# Patient Record
Sex: MALE | Race: WHITE | NOT HISPANIC OR LATINO | Employment: STUDENT | URBAN - METROPOLITAN AREA
[De-identification: names, ages, dates, MRNs, and addresses within clinical notes are randomized per-mention and may not be internally consistent; named-entity substitution may affect disease eponyms.]

---

## 2018-03-29 ENCOUNTER — NURSE TRIAGE (OUTPATIENT)
Dept: NURSING | Facility: CLINIC | Age: 28
End: 2018-03-29

## 2018-03-29 NOTE — TELEPHONE ENCOUNTER
Reason for Disposition    [1] Eye pain AND [2] light increases pain    Additional Information    Negative: [1] Major bleeding (e.g., actively dripping or spurting) AND [2] can't be stopped    Negative: Knocked out (unconscious) > 1 minute    Negative: Difficult to awaken or acting confused  (e.g., disoriented, slurred speech)    Negative: Severe neck pain    Negative: Sounds like a life-threatening emergency to the triager    Negative: Wound looks infected    Negative: Foreign body in the eye    Negative: Head injury is the primary problem    Negative: Neck injury is the primary problem    Negative: Vision is blurred or lost in either eye    Negative: Double vision or unable to look upwards    Negative: Cut on eyelid or eyeball (Exception: superficial scratch on eyelid)    Negative: [1] Bleeding AND [2] won't stop after 10 minutes of direct pressure (using correct technique)    Negative: Skin is split open or gaping  (or length > 1/4 inch or 6 mm)    Negative: Bloody or cloudy fluid behind the cornea (clear part)    Negative: Sharp object hit the eye (e.g., metallic chip or flying glass)    Negative: Object hit the eye at high speed  (e.g., paintball, BB, metal from nail hammering, something thrown from a )    Negative: Two black eyes (bilateral)    Negative: Sounds like a serious injury to the triager    Negative: [1] SEVERE pain AND [2] not improved 2 hours after pain medicine/ice packs    Protocols used: EYE INJURY-ADULT-AH    He's not sure if he can get to the ER. He may be able to get in later today. He wanted to know what to use around home. I said he shouldn't use anything stronger than sterile saline, which would be like tears, as anything like Visine would cause pain if there is a scratch in the eye ball. He has light sensitivity.  Ashlyn Batres RN-[facility]

## 2019-01-28 ENCOUNTER — NURSE TRIAGE (OUTPATIENT)
Dept: NURSING | Facility: CLINIC | Age: 29
End: 2019-01-28

## 2019-01-28 NOTE — TELEPHONE ENCOUNTER
"    Reason for Disposition    Scrotum is painful or tender to touch    Additional Information    Negative: [1] Rash or color change of scrotum BUT [2] no swelling or pain    Negative: Inguinal hernia previously diagnosed by a physician    Negative: Swelling followed a genital injury    Negative: Pain in scrotum is main symptom    Answer Assessment - Initial Assessment Questions  1. SCROTAL SWELLING: \"What does the scrotum look like?\" \"How swollen is it?\" (mild, moderate severe; compare to other side)      Left, swollen  2. LOCATION: \"Where is the swelling located?\"      Left testicle  3. ONSET: \"When did the swelling start?\"      today  4. PATTERN: \"Does it come and go, or has it been constant since it started?\"      constant  5. SCROTAL PAIN: \"Is there any pain?\" If so, ask: \"How bad is it?\"  (Scale 1-10; or mild, moderate, severe)      moderate  6. HERNIA: \"Has a doctor ever told you that you have a hernia?\"      no  7. OTHER SYMPTOMS: \"Do you have any other symptoms?\" (e.g., fever, abdominal pain, vomiting, difficulty passing urine)      denies    Protocols used: SCROTUM SWELLING-ADULT-AH      "

## 2023-03-13 ENCOUNTER — LAB (OUTPATIENT)
Dept: LAB | Facility: CLINIC | Age: 33
End: 2023-03-13
Payer: COMMERCIAL

## 2023-03-13 DIAGNOSIS — Z31.41 ENCOUNTER FOR SPERM COUNT FOR FERTILITY TESTING: ICD-10-CM

## 2023-03-13 PROCEDURE — 89322 SEMEN ANAL STRICT CRITERIA: CPT

## 2023-03-14 LAB
ABNORMAL SPERM MORPHOLOGY: 99
ABSTINENCE DAYS: 3 DAYS (ref 2–7)
AGGLUTINATION: NO
ANALYSIS TEMP - CENTIGRADE: 22 CENTIGRADE
COLLECTION METHOD: ABNORMAL
COLLECTION SITE: ABNORMAL
CONSENT TO RELEASE TO PARTNER: YES
DAL- RECEIVED TIME: ABNORMAL
HEAD DEFECT: 96 %
IMMOTILE: 68 %
LIQUEFIED: YES
MIDPIECE DEFECT: 43 %
NON-PROGRESSIVE MOTILITY: 2 %
NORMAL SPERM MORPHOLOGY: 1 % NORMAL FORMS
PROGRESSIVE MOTILITY: 30 %
ROUND CELLS: 0.4 MILLION/ML
SPECIMEN PH: 7.6 PH
SPECIMEN VOLUME: 1.9 ML
SPERM CONCENTRATION: 122 MILLION/ML
TAIL DEFECT: 8 %
TIME OF ANALYSIS: ABNORMAL
TOTAL PROGRESSIVE MOTILE NUMBER: 70 MILLION
TOTAL SPERM NUMBER: 232 MILLION
VISCOUS: NO
VITALITY: 62 %

## 2023-03-16 DIAGNOSIS — R86.9 ABNORMAL SEMEN ANALYSIS: Primary | ICD-10-CM

## 2023-03-17 ENCOUNTER — TELEPHONE (OUTPATIENT)
Dept: UROLOGY | Facility: CLINIC | Age: 33
End: 2023-03-17
Payer: COMMERCIAL

## 2023-03-17 DIAGNOSIS — N46.9 MALE INFERTILITY: Primary | ICD-10-CM

## 2023-03-17 NOTE — LETTER
Mr.Joseph ANGELO Garcia  [address] [facility] [address]        March 17, 2023    Dear ,    Dr. Romero has placed a few labs for you to complete prior to your appointment with him. They are listed below. Before an appointment the patient should have the following completed.     Semen Analysis (Strict Morph). For the sample the patient needs to abstain from ejaculation for 2-5 days prior, and the sample should be collected in clinic. Ejaculating too frequently can deplete the count whereas abstaining for long periods of time can decrease the number of live sperm. No lubrication, powders, saliva, or intercourse can be used. Partners can be in the room and help produce the sample. Please call and set an appointment up right away. They tend to book up to 1 month ahead.  If this has already been completed it can be faxed to ([phone]).     of  [facility]  [address] [place] [address]  [phone]      Lab work - Testosterone free and total, FSH (follicle stimulating hormone), and Estradiol ultra sensitive. This lab work can be done at any [facility] lab but must be drawn early in the morning before 8 am. Please complete these labs at least 2 weeks prior to your appointment. Some of the labs can take over a week to process.      Any previous OB/GYN office notes, urology office notes, or operative reports (varicocele, and testicular surgery or treatment). This can be faxed to ([phone]).    If you have any questions or concerns please contact us here at the clinic or Mychart us.     Thank you  Your Urology Care Team

## 2023-03-17 NOTE — TELEPHONE ENCOUNTER
[facility] Call Center    Phone Message    May a detailed message be left on voicemail: yes     Reason for Call: Order(s): Other:   Reason for requested: lab orders  Date needed: 06/01/2023  Provider name:     Please place any necessary orders for pts fertility appt on 6/14/23- thank you      Action Taken: Message routed to:  Adult Clinics: Urology p [id number]    Travel Screening: Not Applicable

## 2023-03-17 NOTE — TELEPHONE ENCOUNTER
Called and spoke to patient in regards to labs that need to be completed. Patient recently had a semen analysis on 3/13/23. Patient asked to complete blood draw at any of the [facility] labs. Patient asked if he could get in any sooner than regular scheduled appointment. Staff stated that once he completes the blood draw either Mychart or call the clinic and staff could assist with a sooner appointment.     Rosetta Rascon LPN on 3/17/2023 at 3:32 PM

## 2023-03-25 ENCOUNTER — HEALTH MAINTENANCE LETTER (OUTPATIENT)
Age: 33
End: 2023-03-25

## 2023-04-04 ENCOUNTER — LAB (OUTPATIENT)
Dept: LAB | Facility: CLINIC | Age: 33
End: 2023-04-04
Payer: COMMERCIAL

## 2023-04-04 DIAGNOSIS — N46.9 MALE INFERTILITY, UNSPECIFIED: Primary | ICD-10-CM

## 2023-04-04 DIAGNOSIS — N46.9 MALE INFERTILITY: ICD-10-CM

## 2023-04-04 LAB — FSH SERPL IRP2-ACNC: 3.9 MIU/ML (ref 1.5–12.4)

## 2023-04-04 PROCEDURE — 83001 ASSAY OF GONADOTROPIN (FSH): CPT

## 2023-04-04 PROCEDURE — 82670 ASSAY OF TOTAL ESTRADIOL: CPT

## 2023-04-04 PROCEDURE — 84403 ASSAY OF TOTAL TESTOSTERONE: CPT

## 2023-04-04 PROCEDURE — 36415 COLL VENOUS BLD VENIPUNCTURE: CPT

## 2023-04-04 PROCEDURE — 84270 ASSAY OF SEX HORMONE GLOBUL: CPT

## 2023-04-05 LAB — SHBG SERPL-SCNC: 33 NMOL/L (ref 11–80)

## 2023-04-06 LAB
TESTOST FREE SERPL-MCNC: 9.23 NG/DL
TESTOST SERPL-MCNC: 447 NG/DL (ref 240–950)

## 2023-04-11 LAB — ESTRADIOL SERPL HS-MCNC: 15 PG/ML (ref 10–40)

## 2023-04-13 NOTE — RESULT ENCOUNTER NOTE
Results released to [employer].    Thank you for performing these tests prior to your appointment.      We will discuss these results in depth at your coming appointment.

## 2023-04-18 ASSESSMENT — ENCOUNTER SYMPTOMS
DIARRHEA: 0
HEADACHES: 0
SORE THROAT: 0
CHILLS: 0
DYSURIA: 0
HEARTBURN: 0
HEMATOCHEZIA: 0
MYALGIAS: 1
NERVOUS/ANXIOUS: 1
FREQUENCY: 0
PARESTHESIAS: 0
ABDOMINAL PAIN: 0
PALPITATIONS: 0
NAUSEA: 0
ARTHRALGIAS: 0
FEVER: 0
DIZZINESS: 0
WEAKNESS: 0
EYE PAIN: 0
HEMATURIA: 0
JOINT SWELLING: 0
CONSTIPATION: 0
SHORTNESS OF BREATH: 0
COUGH: 0

## 2023-04-19 ENCOUNTER — OFFICE VISIT (OUTPATIENT)
Dept: FAMILY MEDICINE | Facility: CLINIC | Age: 33
End: 2023-04-19
Payer: COMMERCIAL

## 2023-04-19 VITALS
HEART RATE: 76 BPM | RESPIRATION RATE: 16 BRPM | DIASTOLIC BLOOD PRESSURE: 87 MMHG | SYSTOLIC BLOOD PRESSURE: 126 MMHG | TEMPERATURE: 97.6 F | BODY MASS INDEX: 27.13 KG/M2 | OXYGEN SATURATION: 100 % | HEIGHT: 78 IN | WEIGHT: 234.5 LBS

## 2023-04-19 DIAGNOSIS — Z00.00 ROUTINE GENERAL MEDICAL EXAMINATION AT A HEALTH CARE FACILITY: ICD-10-CM

## 2023-04-19 DIAGNOSIS — Z13.1 SCREENING FOR DIABETES MELLITUS: ICD-10-CM

## 2023-04-19 DIAGNOSIS — J30.2 CHRONIC SEASONAL ALLERGIC RHINITIS: ICD-10-CM

## 2023-04-19 DIAGNOSIS — Z23 HIGH PRIORITY FOR 2019-NCOV VACCINE: ICD-10-CM

## 2023-04-19 DIAGNOSIS — Z11.59 NEED FOR HEPATITIS C SCREENING TEST: ICD-10-CM

## 2023-04-19 DIAGNOSIS — Z11.4 SCREENING FOR HIV (HUMAN IMMUNODEFICIENCY VIRUS): ICD-10-CM

## 2023-04-19 DIAGNOSIS — Z13.220 SCREENING FOR HYPERLIPIDEMIA: Primary | ICD-10-CM

## 2023-04-19 LAB
ANION GAP SERPL CALCULATED.3IONS-SCNC: 4 MMOL/L (ref 3–14)
BUN SERPL-MCNC: 16 MG/DL (ref 7–30)
CALCIUM SERPL-MCNC: 9.5 MG/DL (ref 8.5–10.1)
CHLORIDE BLD-SCNC: 109 MMOL/L (ref 94–109)
CHOLEST SERPL-MCNC: 144 MG/DL
CO2 SERPL-SCNC: 24 MMOL/L (ref 20–32)
CREAT SERPL-MCNC: 0.9 MG/DL (ref 0.66–1.25)
FASTING STATUS PATIENT QL REPORTED: YES
GFR SERPL CREATININE-BSD FRML MDRD: >90 ML/MIN/1.73M2
GLUCOSE BLD-MCNC: 105 MG/DL (ref 70–99)
HBA1C MFR BLD: 5.2 % (ref 0–5.6)
HDLC SERPL-MCNC: 39 MG/DL
LDLC SERPL CALC-MCNC: 73 MG/DL
NONHDLC SERPL-MCNC: 105 MG/DL
POTASSIUM BLD-SCNC: 4.9 MMOL/L (ref 3.4–5.3)
SODIUM SERPL-SCNC: 137 MMOL/L (ref 133–144)
TRIGL SERPL-MCNC: 161 MG/DL

## 2023-04-19 PROCEDURE — 99385 PREV VISIT NEW AGE 18-39: CPT | Mod: 25 | Performed by: INTERNAL MEDICINE

## 2023-04-19 PROCEDURE — 80061 LIPID PANEL: CPT | Performed by: INTERNAL MEDICINE

## 2023-04-19 PROCEDURE — 0124A COVID-19 VACCINE BIVALENT BOOSTER 12+ (PFIZER): CPT | Performed by: INTERNAL MEDICINE

## 2023-04-19 PROCEDURE — 87389 HIV-1 AG W/HIV-1&-2 AB AG IA: CPT | Performed by: INTERNAL MEDICINE

## 2023-04-19 PROCEDURE — 80048 BASIC METABOLIC PNL TOTAL CA: CPT | Performed by: INTERNAL MEDICINE

## 2023-04-19 PROCEDURE — 36415 COLL VENOUS BLD VENIPUNCTURE: CPT | Performed by: INTERNAL MEDICINE

## 2023-04-19 PROCEDURE — 83036 HEMOGLOBIN GLYCOSYLATED A1C: CPT | Performed by: INTERNAL MEDICINE

## 2023-04-19 PROCEDURE — 91312 COVID-19 VACCINE BIVALENT BOOSTER 12+ (PFIZER): CPT | Performed by: INTERNAL MEDICINE

## 2023-04-19 PROCEDURE — 86803 HEPATITIS C AB TEST: CPT | Performed by: INTERNAL MEDICINE

## 2023-04-19 ASSESSMENT — ENCOUNTER SYMPTOMS
ARTHRALGIAS: 0
CONSTIPATION: 0
JOINT SWELLING: 0
NERVOUS/ANXIOUS: 1
EYE PAIN: 0
MYALGIAS: 1
SHORTNESS OF BREATH: 0
ABDOMINAL PAIN: 0
DIZZINESS: 0
PARESTHESIAS: 0
HEMATOCHEZIA: 0
CHILLS: 0
HEMATURIA: 0
FEVER: 0
PALPITATIONS: 0
COUGH: 0
NAUSEA: 0
HEARTBURN: 0
DIARRHEA: 0
SORE THROAT: 0
HEADACHES: 0
DYSURIA: 0
FREQUENCY: 0
WEAKNESS: 0

## 2023-04-19 ASSESSMENT — PAIN SCALES - GENERAL: PAINLEVEL: NO PAIN (0)

## 2023-04-19 NOTE — NURSING NOTE
Prior to immunization administration, verified patients identity using patient s name and date of birth. Please see Immunization Activity for additional information.     Screening Questionnaire for Adult Immunization    Are you sick today?   No   Do you have allergies to medications, food, a vaccine component or latex?   No   Have you ever had a serious reaction after receiving a vaccination?   No   Do you have a long-term health problem with heart, lung, kidney, or metabolic disease (e.g., diabetes), asthma, a blood disorder, no spleen, complement component deficiency, a cochlear implant, or a spinal fluid leak?  Are you on long-term aspirin therapy?   No   Do you have cancer, leukemia, HIV/AIDS, or any other immune system problem?   No   Do you have a parent, brother, or sister with an immune system problem?   No   In the past 3 months, have you taken medications that affect  your immune system, such as prednisone, other steroids, or anticancer drugs; drugs for the treatment of rheumatoid arthritis, Crohn s disease, or psoriasis; or have you had radiation treatments?   No   Have you had a seizure, or a brain or other nervous system problem?   No   During the past year, have you received a transfusion of blood or blood    products, or been given immune (gamma) globulin or antiviral drug?   No   For women: Are you pregnant or is there a chance you could become       pregnant during the next month?   No   Have you received any vaccinations in the past 4 weeks?   No     Immunization questionnaire answers were all negative.      Injection of Covid bivalent PPfizer given by Demi Rivera MA. Patient instructed to remain in clinic for 15 minutes afterwards, and to report any adverse reactions.     Screening performed by Demi Rivera MA on 4/19/2023 at 8:03 AM.

## 2023-04-19 NOTE — PROGRESS NOTES
SUBJECTIVE:   CC: Phill is an 32 year old who presents for preventative health visit.        View : No data to display.              Patient has been advised of split billing requirements and indicates understanding: Yes  Healthy Habits:     Getting at least 3 servings of Calcium per day:  Yes    Bi-annual eye exam:  Yes    Dental care twice a year:  Yes    Sleep apnea or symptoms of sleep apnea:  Daytime drowsiness and Excessive snoring    Diet:  Regular (no restrictions)    Frequency of exercise:  2-3 days/week    Duration of exercise:  Greater than 60 minutes    Taking medications regularly:  Yes    Medication side effects:  Not applicable    PHQ-2 Total Score: 2    Additional concerns today:  Yes              Today's PHQ-2 Score:       2023    10:27 AM   PHQ-2 (  Pfizer)   Q1: Little interest or pleasure in doing things 1   Q2: Feeling down, depressed or hopeless 1   PHQ-2 Score 2   Q1: Little interest or pleasure in doing things Several days    Several days   Q2: Feeling down, depressed or hopeless Several days    Several days   PHQ-2 Score 2    2       Have you ever done Advance Care Planning? (For example, a Health Directive, POLST, or a discussion with a medical provider or your loved ones about your wishes): No, advance care planning information given to patient to review.  Advanced care planning was discussed at today's visit.  Has been talking with wife    Social History     Tobacco Use     Smoking status: Former     Packs/day: 0.50     Types: Cigarettes     Quit date: 2013     Years since quittin.7     Smokeless tobacco: Never   Vaping Use     Vaping status: Never Used   Substance Use Topics     Alcohol use: Yes     Comment: occ             2023    10:27 AM   Alcohol Use   Prescreen: >3 drinks/day or >7 drinks/week? No          View : No data to display.                Last PSA: No results found for: PSA    Reviewed orders with patient. Reviewed health maintenance and updated orders  "accordingly - Yes  Lab work is in process    Reviewed and updated as needed this visit by clinical staff   Tobacco  Allergies  Meds              Reviewed and updated as needed this visit by Provider                     Review of Systems   Constitutional: Negative for chills and fever.   HENT: Positive for congestion. Negative for ear pain, hearing loss and sore throat.    Eyes: Negative for pain and visual disturbance.   Respiratory: Negative for cough and shortness of breath.    Cardiovascular: Negative for chest pain, palpitations and peripheral edema.   Gastrointestinal: Negative for abdominal pain, constipation, diarrhea, heartburn, hematochezia and nausea.   Genitourinary: Negative for dysuria, frequency, genital sores, hematuria, impotence, penile discharge and urgency.   Musculoskeletal: Positive for myalgias. Negative for arthralgias and joint swelling.   Skin: Negative for rash.   Neurological: Negative for dizziness, weakness, headaches and paresthesias.   Psychiatric/Behavioral: Negative for mood changes. The patient is nervous/anxious.          OBJECTIVE:   /87 (BP Location: Right arm, Patient Position: Sitting, Cuff Size: Adult Large)   Pulse 76   Temp 97.6  F (36.4  C) (Oral)   Resp 16   Ht 1.99 m (6' 6.35\")   Wt 106.4 kg (234 lb 8 oz)   SpO2 100%   BMI 26.86 kg/m      Physical Exam  GENERAL: healthy, alert and no distress  EYES: Eyes grossly normal to inspection, PERRL and conjunctivae and sclerae normal  HENT: ear canals and TM's normal, nose and mouth without ulcers or lesions  NECK: no adenopathy, no asymmetry, masses, or scars and thyroid normal to palpation  RESP: lungs clear to auscultation - no rales, rhonchi or wheezes  CV: regular rate and rhythm, normal S1 S2, no S3 or S4, no murmur, click or rub, no peripheral edema and peripheral pulses strong  ABDOMEN: soft, nontender, no hepatosplenomegaly, no masses and bowel sounds normal  MS: no gross musculoskeletal defects noted, no " edema  SKIN: no suspicious lesions or rashes  NEURO: Normal strength and tone, mentation intact and speech normal  PSYCH: mentation appears normal, affect normal/bright    Diagnostic Test Results:  Labs reviewed in Epic    ASSESSMENT/PLAN:   (Z13.220) Screening for hyperlipidemia  (primary encounter diagnosis)  Comment:   Plan: Lipid panel reflex to direct LDL Fasting            (Z11.4) Screening for HIV (human immunodeficiency virus)  Comment:   Plan: HIV Antigen Antibody Combo            (Z11.59) Need for hepatitis C screening test  Comment:   Plan: Hepatitis C Screen Reflex to HCV RNA Quant and         Genotype            (Z00.00) Routine general medical examination at a health care facility  Comment: He is in good physical health  Denies any complaints whatsoever except allergies which happen all the year around  He is up-to-date with all the vaccines except the COVID booster  We discussed about flu vaccine today  Plan: Basic metabolic panel  (Ca, Cl, CO2, Creat,         Gluc, K, Na, BUN)            (Z13.1) Screening for diabetes mellitus  Comment:  Plan: Hemoglobin A1c        He has a family history of diabetes    (Z23) High priority for 2019-nCoV vaccine  Comment:   Plan: COVID-19,PF,PFIZER BOOSTER BIVALENT 12+Yrs            (J30.2) Chronic seasonal allergic rhinitis  Comment:   Plan: Adult Allergy/Asthma Referral        He has chronic allergies all the year around  For the same lot of sneezing and runny nose  In the winter the allergies are bad because of the closed atmosphere  He does have pets at home  In the summer they are bad from the pollen and dust  He want to see an allergy specialist      Patient has been advised of split billing requirements and indicates understanding: No      COUNSELING:   Reviewed preventive health counseling, as reflected in patient instructions       Regular exercise       Healthy diet/nutrition       Vision screening        He reports that he quit smoking about 9 years ago.  His smoking use included cigarettes. He smoked an average of .5 packs per day. He has never used smokeless tobacco.            Bg Osorio MD  [facility]

## 2023-04-20 LAB
HCV AB SERPL QL IA: NONREACTIVE
HIV 1+2 AB+HIV1 P24 AG SERPL QL IA: NONREACTIVE

## 2023-04-26 ENCOUNTER — LAB (OUTPATIENT)
Dept: LAB | Facility: CLINIC | Age: 33
End: 2023-04-26
Payer: COMMERCIAL

## 2023-04-26 DIAGNOSIS — N46.9 MALE INFERTILITY: ICD-10-CM

## 2023-04-26 DIAGNOSIS — N46.9 MALE INFERTILITY, UNSPECIFIED: ICD-10-CM

## 2023-04-26 LAB
ABNORMAL SPERM MORPHOLOGY: 98
ABSTINENCE DAYS: 3 DAYS (ref 2–7)
AGGLUTINATION: NO
ANALYSIS TEMP - CENTIGRADE: 22 CENTIGRADE
COLLECTION METHOD: ABNORMAL
COLLECTION SITE: ABNORMAL
CONSENT TO RELEASE TO PARTNER: YES
DAL- RECEIVED TIME: ABNORMAL
HEAD DEFECT: 95 %
IMMOTILE: 59 %
LIQUEFIED: YES
MIDPIECE DEFECT: 38 %
NON-PROGRESSIVE MOTILITY: 7 %
NORMAL SPERM MORPHOLOGY: 2 % NORMAL FORMS
PROGRESSIVE MOTILITY: 34 %
ROUND CELLS: 0.4 MILLION/ML
SPECIMEN PH: 7.6 PH
SPECIMEN VOLUME: 2 ML
SPERM CONCENTRATION: 102.5 MILLION/ML
TAIL DEFECT: 7 %
TIME OF ANALYSIS: ABNORMAL
TOTAL PROGRESSIVE MOTILE NUMBER: 70 MILLION
TOTAL SPERM NUMBER: 205 MILLION
VISCOUS: NO
VITALITY: ABNORMAL

## 2023-04-26 PROCEDURE — 89322 SEMEN ANAL STRICT CRITERIA: CPT

## 2023-04-27 NOTE — RESULT ENCOUNTER NOTE
Results released to [employer].    Thank you for performing these tests prior to your appointment.      Just low sperm shape was seen.    We will discuss these results in depth at your coming appointment.

## 2023-08-07 ENCOUNTER — OFFICE VISIT (OUTPATIENT)
Dept: ALLERGY | Facility: CLINIC | Age: 33
End: 2023-08-07
Payer: COMMERCIAL

## 2023-08-07 VITALS
BODY MASS INDEX: 27.76 KG/M2 | HEART RATE: 87 BPM | HEIGHT: 78 IN | OXYGEN SATURATION: 97 % | WEIGHT: 239.9 LBS | SYSTOLIC BLOOD PRESSURE: 157 MMHG | DIASTOLIC BLOOD PRESSURE: 75 MMHG

## 2023-08-07 DIAGNOSIS — J31.0 RHINOCONJUNCTIVITIS: Primary | ICD-10-CM

## 2023-08-07 DIAGNOSIS — H10.9 RHINOCONJUNCTIVITIS: Primary | ICD-10-CM

## 2023-08-07 PROCEDURE — 36415 COLL VENOUS BLD VENIPUNCTURE: CPT | Performed by: ALLERGY & IMMUNOLOGY

## 2023-08-07 PROCEDURE — 86003 ALLG SPEC IGE CRUDE XTRC EA: CPT | Performed by: ALLERGY & IMMUNOLOGY

## 2023-08-07 PROCEDURE — 99203 OFFICE O/P NEW LOW 30 MIN: CPT | Performed by: ALLERGY & IMMUNOLOGY

## 2023-08-07 RX ORDER — CETIRIZINE HYDROCHLORIDE 10 MG/1
10 TABLET ORAL DAILY
COMMUNITY

## 2023-08-07 ASSESSMENT — ENCOUNTER SYMPTOMS
WHEEZING: 0
ABDOMINAL PAIN: 0
EYE ITCHING: 1
NERVOUS/ANXIOUS: 0
DIARRHEA: 0
SORE THROAT: 1
DIZZINESS: 0
CHILLS: 0
FATIGUE: 0
EYE DISCHARGE: 1
NAUSEA: 0
CHEST TIGHTNESS: 0
FACIAL SWELLING: 0
VOMITING: 0
LIGHT-HEADEDNESS: 0
CONSTIPATION: 0
COUGH: 1
ADENOPATHY: 0
SHORTNESS OF BREATH: 0
SINUS PRESSURE: 1
RHINORRHEA: 1
HEADACHES: 1
EYE REDNESS: 1
ACTIVITY CHANGE: 0
FEVER: 0
JOINT SWELLING: 0

## 2023-08-07 NOTE — LETTER
8/7/2023         RE: Oswaldo Garcia  [address]  [facility] [address]        Dear Colleague,    Thank you for referring your patient, Oswaldo Garcia, to the [facility]. Please see a copy of my visit note below.    Oswaldo Garcia was seen in the Allergy Clinic at [facility].    Oswaldo Garcia is a 32 year old White male being seen today at the request of Dr. Osorio in consultation for allergies.    Reports having year round rhinoconjunctivitis. Symptoms are worse in the winter months. Sneezing, cough, rhinorrhea, nasal congestion, itchy, red, and burning eyes. Takes cetirizine daily - sometimes provides relief. Has not been using any nasal sprays or eye drops. Had testing as a child - positive for numerous allergens.    Has pet cats and dogs - kept out of the bedroom.    Recently breaking out in hives - describes them as about the size of a pencil eraser, raised, not particularly itchy and doesn't notice them unless someone else points them out. Resolve within a couple of hours. Breakouts are sporadic.      No known family history of allergies or asthma    Past Medical History:   Diagnosis Date     Acute appendicitis with peritoneal abscess 02/11/09    D/C 02/13/09     Fracture of ankle, trimalleolar, closed      History reviewed. No pertinent family history.  Past Surgical History:   Procedure Laterality Date     APPENDECTOMY  3/10/2009     COLONOSCOPY  11/06/09     OPEN REDUCTION INTERNAL FIXATION ANKLE  8/16/2013    Procedure: OPEN REDUCTION INTERNAL FIXATION ANKLE;  Open Reduction Internal Fixation Right Ankle;  Surgeon: George De La Rosa DPM;  Location:  OR       ENVIRONMENTAL HISTORY:   [place]swal[place] The home is heated with a gas furnace. They does have central air conditioning. The patient's bedroom is furnished with allergen pillowcase cover and fabric window coverings.  Pets inside the house include 2  cat(s) and 2 dog(s). There is history of mice infestation. Do you smoke cigarettes or other recreational drugs? No Do you vape or use an e-cigarette? Yes. There is/are 0 smokers living in the house. There is/are 0 who smoke ecigarettes/vape living in the house. The house does not have a damp basement.     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 He lives with his spouse.  His spouse works as a/an food executive for [employer].    Review of Systems   Constitutional:  Negative for activity change, chills, fatigue and fever.   HENT:  Positive for congestion, postnasal drip, rhinorrhea, sinus pressure, sneezing and sore throat. Negative for ear pain, facial swelling and nosebleeds.    Eyes:  Positive for discharge, redness and itching.   Respiratory:  Positive for cough. Negative for chest tightness, shortness of breath and wheezing.    Cardiovascular:  Negative for chest pain.   Gastrointestinal:  Negative for abdominal pain, constipation, diarrhea, nausea and vomiting.   Musculoskeletal:  Negative for joint swelling.   Skin:  Negative for rash.   Neurological:  Positive for headaches. Negative for dizziness and light-headedness.   Hematological:  Negative for adenopathy.   Psychiatric/Behavioral:  Negative for behavioral problems. The patient is not nervous/anxious.          Current Outpatient Medications:      cetirizine (ZYRTEC) 10 MG tablet, Take 10 mg by mouth daily, Disp: , Rfl:   Immunization History   Administered Date(s) Administered     COVID-19 Bivalent 12+ (Pfizer) 04/19/2023     COVID-19 MONOVALENT 12+ (Pfizer) 12/17/2021     COVID-19 Vaccine (Hugo) 04/10/2021     HIB (PRP-T) 08/23/1990, 01/21/1991, 06/04/1991     Historical DTP/aP 1989, 02/09/1990, 04/10/1990, 06/04/1991, 08/17/1995     MMR 01/21/1991     OPV, trivalent, live 1989, 02/09/1990, 06/04/1991, 08/17/1995     TD,PF 7+ (Tenivac) 08/13/2004     TDAP Vaccine (Boostrix) 08/07/2013  "    No Known Allergies      EXAM:   BP (!) 157/75   Pulse 87   Ht 2.007 m (6' 7\")   Wt 108.8 kg (239 lb 14.4 oz)   SpO2 97%   BMI 27.03 kg/m    Physical Exam  Vitals and nursing note reviewed.   Constitutional:       Appearance: Normal appearance.   HENT:      Head: Normocephalic and atraumatic.      Right Ear: Tympanic membrane, ear canal and external ear normal.      Left Ear: Tympanic membrane, ear canal and external ear normal.      Nose: No mucosal edema or rhinorrhea.      Mouth/Throat:      Mouth: Mucous membranes are moist. No oral lesions.      Pharynx: Oropharynx is clear. Uvula midline. No posterior oropharyngeal erythema.   Eyes:      General: Lids are normal. No scleral icterus.     Extraocular Movements: Extraocular movements intact.      Conjunctiva/sclera: Conjunctivae normal.   Neck:      Comments: No asymmetry, masses, or scars  Cardiovascular:      Rate and Rhythm: Normal rate and regular rhythm.      Heart sounds: Normal heart sounds, S1 normal and S2 normal.   Pulmonary:      Effort: Pulmonary effort is normal. No prolonged expiration or respiratory distress.      Breath sounds: Normal breath sounds and air entry.   Musculoskeletal:      Comments: No musculoskeletal defects noted   Skin:     General: Skin is warm and dry.      Findings: No lesion or rash.   Neurological:      General: No focal deficit present.      Mental Status: He is alert.   Psychiatric:         Mood and Affect: Mood and affect normal.           WORKUP: None    ASSESSMENT/PLAN:  Oswaldo Garcia is a 32 year old male here for evaluation of allergies.    1. Rhinoconjunctivitis - Long-standing history of chronic rhinoconjunctivitis symptoms. Cetirizine provides minimal relief and he has not tried nasal or ocular medications. He is interested in allergen immunotherapy treatment. Skin testing was deferred due to recent antihistamine use. Oswaldo was counseled regarding the risks and benefits of allergen immunotherapy " treatment. The risks include hives, swelling, cough, shortness of breath, and anaphylaxis which may be fatal if not treated promptly. The rate of systemic allergic reactions, including anaphylaxis, from immunotherapy is 0.025-0.4% but affects 5 to 7% of patients. The risk of death is 1 and 2.5 million. Oswaldo was advised regarding the epinephrine autoinjector policy and that they must remain in the allergy clinic for 30 minutes following injection administration for monitoring.  Additionally, the expected duration of treatment and financial responsibility and potential out-of-pocket costs were discussed.  After our discussion he wishes to proceed with treatment.    - plan to initiate allergen immunotherapy treatment pending lab results - consent and acknowledgment forms signed in clinic today  - continue cetirizine - 10mg once to twice daily as needed  - Allergen cat epithellium IgE; Future  - Allergen dog epithelium IgE; Future  - Allergen Jose grass IgE; Future  - Allergen bonifacio IgE; Future  - Allergen D farinae IgE; Future  - Allergen D pteronyssinus IgE; Future  - Allergen alternaria alternata IgE; Future  - Allergen aspergillus fumigatus IgE; Future  - Allergen cladosporium herbarum IgE; Future  - Allergen Epicoccum purpurascens IgE; Future  - Allergen penicillium notatum IgE; Future  - Allergen ki white IgE; Future  - Allergen Cedar IgE; Future  - Allergen cottonwood IgE; Future  - Allergen elm IgE; Future  - Allergen maple box elder IgE; Future  - Allergen oak white IgE; Future  - Allergen Red [place] IgE; Future  - Allergen silver  birch IgE; Future  - Allergen Tree [place] IgE; Future  - Allergen [place] Tree; Future  - Allergen white pine IgE; Future  - Allergen English plantain IgE; Future  - Allergen giant ragweed IgE; Future  - Allergen lamb's quarter IgE; Future  - Allergen Mugwort IgE; Future  - Allergen ragweed short IgE; Future  - Allergen Sagebrush Wormwood IgE; Future  - Allergen  Sheep Sorrel IgE; Future  - Allergen thistle Russian IgE; Future  - Allergen Weed Nettle IgE; Future  - Allergen, Kochia/Firebush; Future      Follow-up in the allergy clinic pending lab results.      Thank you for allowing me to participate in the care of Oswaldo Garcia.      Ewa Greenfield MD, [facility]  Allergy/Immunology  [facility]      Chart documentation done in part with Dragon Voice Recognition Software. Although reviewed after completion, some word and grammatical errors may remain.      Again, thank you for allowing me to participate in the care of your patient.        Sincerely,        Ewa Greenfield MD

## 2023-08-07 NOTE — PATIENT INSTRUCTIONS
If you have any questions regarding your allergies, asthma, or what we discussed during your visit today please call the allergy clinic or contact us via Kyp.    [facility] Allergy RN Line: [phone] - call this number with any questions during or after business/clinic hours  [facility] Scheduling Line: [phone]  [facility] Scheduling Line: [phone] - this number is ONLY for scheduling at the [facility] and should not be used to get in touch with the allergy team    All visits for food challenges, medication/drug allergy testing, and drug challenges MUST be scheduled through the allergy clinic nurse. Please call the nurse at [phone] or send a Kyp message for scheduling. Appointments for these visits that are made through the schedulers or via Kyp may be cancelled or rescheduled.    Clinic Schedule:   [place] - Monday, Tuesday, and Thursday  [address] [place] [address]    [facility] - Wednesday  [address] [place] [address]      These are the billing and diagnosis codes that your insurance company may need to help you determine if allergy shots are covered and what potential out of pocked costs you may have. You may also want to contact the [place] [facility] Business Office/Cost of Care Estimate Line at [phone] for more information.      Traditional Immunotherapy = 95453   - Billed each visit to allergy shot clinic    Cluster/Rapid Immunotherapy = 25685  - Billed hourly at each visit, number of units billed depends on the length of your visit. (cluster build is typically a minimum of 10 total units)    Allergy Shot Serum: 27296 - the amount of serum in total varies depending on how many allergy shots you will need. At the start of treatment, each injection is billed for 30 units. Treatment consists of a minimum of 1 injection up to a maximum of 4 injections depending on how many  allergens are included in the treatment.  (1 injection/serum = 30 units) (2 injections/serums = 60 units) (3 injections/serums = 90 units) (4 injections/serums = 120 units)      Potential Diagnosis Codes:    Allergic Rhinitis Due to Dust Mite or Mold - J30.89  Allergic Rhinitis Due to Animals - J30.81  Seasonal Allergic Rhinitis Due to Pollens - J30.1  Allergic Conjunctivitis - H10.13

## 2023-08-07 NOTE — PROGRESS NOTES
Oswaldo Garcia was seen in the Allergy Clinic at [facility].    Oswaldo Garcia is a 32 year old White male being seen today at the request of Dr. Osorio in consultation for allergies.    Reports having year round rhinoconjunctivitis. Symptoms are worse in the winter months. Sneezing, cough, rhinorrhea, nasal congestion, itchy, red, and burning eyes. Takes cetirizine daily - sometimes provides relief. Has not been using any nasal sprays or eye drops. Had testing as a child - positive for numerous allergens.    Has pet cats and dogs - kept out of the bedroom.    Recently breaking out in hives - describes them as about the size of a pencil eraser, raised, not particularly itchy and doesn't notice them unless someone else points them out. Resolve within a couple of hours. Breakouts are sporadic.      No known family history of allergies or asthma    Past Medical History:   Diagnosis Date    Acute appendicitis with peritoneal abscess 02/11/09    D/C 02/13/09    Fracture of ankle, trimalleolar, closed      History reviewed. No pertinent family history.  Past Surgical History:   Procedure Laterality Date    APPENDECTOMY  3/10/2009    COLONOSCOPY  11/06/09    OPEN REDUCTION INTERNAL FIXATION ANKLE  8/16/2013    Procedure: OPEN REDUCTION INTERNAL FIXATION ANKLE;  Open Reduction Internal Fixation Right Ankle;  Surgeon: George De La Rosa DPM;  Location:  OR       ENVIRONMENTAL HISTORY:   [place]al[place] The home is heated with a gas furnace. They does have central air conditioning. The patient's bedroom is furnished with allergen pillowcase cover and fabric window coverings.  Pets inside the house include 2 cat(s) and 2 dog(s). There is history of mice infestation. Do you smoke cigarettes or other recreational drugs? No Do you vape or use an e-cigarette? Yes. There is/are 0 smokers living in the house. There is/are 0 who smoke ecigarettes/vape living in the  "house. The house does not have a damp basement.     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 He lives with his spouse.  His spouse works as a/an food executive for [employer].    Review of Systems   Constitutional:  Negative for activity change, chills, fatigue and fever.   HENT:  Positive for congestion, postnasal drip, rhinorrhea, sinus pressure, sneezing and sore throat. Negative for ear pain, facial swelling and nosebleeds.    Eyes:  Positive for discharge, redness and itching.   Respiratory:  Positive for cough. Negative for chest tightness, shortness of breath and wheezing.    Cardiovascular:  Negative for chest pain.   Gastrointestinal:  Negative for abdominal pain, constipation, diarrhea, nausea and vomiting.   Musculoskeletal:  Negative for joint swelling.   Skin:  Negative for rash.   Neurological:  Positive for headaches. Negative for dizziness and light-headedness.   Hematological:  Negative for adenopathy.   Psychiatric/Behavioral:  Negative for behavioral problems. The patient is not nervous/anxious.          Current Outpatient Medications:     cetirizine (ZYRTEC) 10 MG tablet, Take 10 mg by mouth daily, Disp: , Rfl:   Immunization History   Administered Date(s) Administered    COVID-19 Bivalent 12+ (Pfizer) 04/19/2023    COVID-19 MONOVALENT 12+ (Pfizer) 12/17/2021    COVID-19 Vaccine (Hugo) 04/10/2021    HIB (PRP-T) 08/23/1990, 01/21/1991, 06/04/1991    Historical DTP/aP 1989, 02/09/1990, 04/10/1990, 06/04/1991, 08/17/1995    MMR 01/21/1991    OPV, trivalent, live 1989, 02/09/1990, 06/04/1991, 08/17/1995    TD,PF 7+ (Tenivac) 08/13/2004    TDAP Vaccine (Boostrix) 08/07/2013     No Known Allergies      EXAM:   BP (!) 157/75   Pulse 87   Ht 2.007 m (6' 7\")   Wt 108.8 kg (239 lb 14.4 oz)   SpO2 97%   BMI 27.03 kg/m    Physical Exam  Vitals and nursing note reviewed.   Constitutional:       Appearance: Normal appearance.   HENT:      " Head: Normocephalic and atraumatic.      Right Ear: Tympanic membrane, ear canal and external ear normal.      Left Ear: Tympanic membrane, ear canal and external ear normal.      Nose: No mucosal edema or rhinorrhea.      Mouth/Throat:      Mouth: Mucous membranes are moist. No oral lesions.      Pharynx: Oropharynx is clear. Uvula midline. No posterior oropharyngeal erythema.   Eyes:      General: Lids are normal. No scleral icterus.     Extraocular Movements: Extraocular movements intact.      Conjunctiva/sclera: Conjunctivae normal.   Neck:      Comments: No asymmetry, masses, or scars  Cardiovascular:      Rate and Rhythm: Normal rate and regular rhythm.      Heart sounds: Normal heart sounds, S1 normal and S2 normal.   Pulmonary:      Effort: Pulmonary effort is normal. No prolonged expiration or respiratory distress.      Breath sounds: Normal breath sounds and air entry.   Musculoskeletal:      Comments: No musculoskeletal defects noted   Skin:     General: Skin is warm and dry.      Findings: No lesion or rash.   Neurological:      General: No focal deficit present.      Mental Status: He is alert.   Psychiatric:         Mood and Affect: Mood and affect normal.           WORKUP: None    ASSESSMENT/PLAN:  Oswaldo Garcia is a 32 year old male here for evaluation of allergies.    1. Rhinoconjunctivitis - Long-standing history of chronic rhinoconjunctivitis symptoms. Cetirizine provides minimal relief and he has not tried nasal or ocular medications. He is interested in allergen immunotherapy treatment. Skin testing was deferred due to recent antihistamine use. Oswaldo was counseled regarding the risks and benefits of allergen immunotherapy treatment. The risks include hives, swelling, cough, shortness of breath, and anaphylaxis which may be fatal if not treated promptly. The rate of systemic allergic reactions, including anaphylaxis, from immunotherapy is 0.025-0.4% but affects 5 to 7% of patients. The risk  of death is 1 and 2.5 million. Oswaldo was advised regarding the epinephrine autoinjector policy and that they must remain in the allergy clinic for 30 minutes following injection administration for monitoring.  Additionally, the expected duration of treatment and financial responsibility and potential out-of-pocket costs were discussed.  After our discussion he wishes to proceed with treatment.    - plan to initiate allergen immunotherapy treatment pending lab results - consent and acknowledgment forms signed in clinic today  - continue cetirizine - 10mg once to twice daily as needed  - Allergen cat epithellium IgE; Future  - Allergen dog epithelium IgE; Future  - Allergen Jose grass IgE; Future  - Allergen bonifacio IgE; Future  - Allergen D farinae IgE; Future  - Allergen D pteronyssinus IgE; Future  - Allergen alternaria alternata IgE; Future  - Allergen aspergillus fumigatus IgE; Future  - Allergen cladosporium herbarum IgE; Future  - Allergen Epicoccum purpurascens IgE; Future  - Allergen penicillium notatum IgE; Future  - Allergen ki white IgE; Future  - Allergen Cedar IgE; Future  - Allergen cottonwood IgE; Future  - Allergen elm IgE; Future  - Allergen maple box elder IgE; Future  - Allergen oak white IgE; Future  - Allergen Red [place] IgE; Future  - Allergen silver  birch IgE; Future  - Allergen Tree White [place] IgE; Future  - Allergen [place]qua Tree; Future  - Allergen white pine IgE; Future  - Allergen English plantain IgE; Future  - Allergen giant ragweed IgE; Future  - Allergen lamb's quarter IgE; Future  - Allergen Mugwort IgE; Future  - Allergen ragweed short IgE; Future  - Allergen Sagebrush [place] IgE; Future  - Allergen Sheep Sorrel IgE; Future  - Allergen thistle Russian IgE; Future  - Allergen Weed Nettle IgE; Future  - Allergen, Kochia/Firebush; Future      Follow-up in the allergy clinic pending lab results.      Thank you for allowing me to participate in the care of Oswaldo STEPHENS  Jose.      Ewa Greenfield MD, FAAAAI  Allergy/Immunology  [facility]      Chart documentation done in part with Dragon Voice Recognition Software. Although reviewed after completion, some word and grammatical errors may remain.

## 2023-08-08 LAB
A ALTERNATA IGE QN: 0.95 KU(A)/L
A FUMIGATUS IGE QN: <0.1 KU(A)/L
C HERBARUM IGE QN: <0.1 KU(A)/L
CALIF WALNUT POLN IGE QN: 0.23 KU(A)/L
CAT DANDER IGG QN: 29 KU(A)/L
CEDAR IGE QN: <0.1 KU(A)/L
COMMON RAGWEED IGE QN: 2.32 KU(A)/L
COTTONWOOD IGE QN: 0.17 KU(A)/L
D FARINAE IGE QN: <0.1 KU(A)/L
D PTERONYSS IGE QN: <0.1 KU(A)/L
DOG DANDER+EPITH IGE QN: 9.73 KU(A)/L
E PURPURASCENS IGE QN: 0.13 KU(A)/L
EAST WHITE PINE IGE QN: 0.15 KU(A)/L
ENGL PLANTAIN IGE QN: 0.13 KU(A)/L
FIREBUSH IGE QN: 0.13 KU(A)/L
GIANT RAGWEED IGE QN: 0.88 KU(A)/L
GOOSEFOOT IGE QN: <0.1 KU(A)/L
JOHNSON GRASS IGE QN: 0.21 KU(A)/L
MAPLE IGE QN: 0.61 KU(A)/L
MUGWORT IGE QN: 0.14 KU(A)/L
NETTLE IGE QN: 0.17 KU(A)/L
P NOTATUM IGE QN: <0.1 KU(A)/L
RED MULBERRY IGE QN: <0.1 KU(A)/L
SALTWORT IGE QN: 0.15 KU(A)/L
SHEEP SORREL IGE QN: 0.15 KU(A)/L
SILVER BIRCH IGE QN: 0.11 KU(A)/L
TIMOTHY IGE QN: 0.45 KU(A)/L
WHITE ASH IGE QN: 0.2 KU(A)/L
WHITE ELM IGE QN: 0.22 KU(A)/L
WHITE MULBERRY IGE QN: <0.1 KU(A)/L
WHITE OAK IGE QN: 0.21 KU(A)/L
WORMWOOD IGE QN: 0.16 KU(A)/L

## 2023-08-09 ENCOUNTER — MYC MEDICAL ADVICE (OUTPATIENT)
Dept: ALLERGY | Facility: CLINIC | Age: 33
End: 2023-08-09
Payer: COMMERCIAL

## 2023-09-18 ENCOUNTER — MYC MEDICAL ADVICE (OUTPATIENT)
Dept: ALLERGY | Facility: CLINIC | Age: 33
End: 2023-09-18
Payer: COMMERCIAL

## 2023-09-18 DIAGNOSIS — J30.1 SEASONAL ALLERGIC RHINITIS DUE TO POLLEN: Primary | ICD-10-CM

## 2023-09-18 DIAGNOSIS — J30.81 ALLERGIC RHINITIS DUE TO ANIMAL (CAT) (DOG) HAIR AND DANDER: Primary | ICD-10-CM

## 2023-09-18 DIAGNOSIS — J30.81 ALLERGIC RHINITIS DUE TO ANIMALS: ICD-10-CM

## 2023-09-18 DIAGNOSIS — H10.13 ALLERGIC CONJUNCTIVITIS, BILATERAL: ICD-10-CM

## 2023-09-18 DIAGNOSIS — J30.1 ALLERGIC RHINITIS DUE TO POLLEN, UNSPECIFIED SEASONALITY: ICD-10-CM

## 2023-09-18 DIAGNOSIS — J30.89 ALLERGIC RHINITIS DUE TO MOLD: ICD-10-CM

## 2023-09-18 NOTE — PROGRESS NOTES
ALLERGY SOLUTION NEW REQUEST    Oswaldo Garcia 1990 MRN: [id number]    DATE NEEDED:  ASAP  Vial Color   Content   Top Dose         Vial Size  Green 1:1,000, Blue 1:100, Yellow 1:10, and Red 1:1 Cat, Molds   Red 1:1 0.5   5mL  Green 1:1,000, Blue 1:100, Yellow 1:10, and Red 1:1 Dog, Weeds   Red 1:1 0.5   5mL  Green 1:1,000, Blue 1:100, Yellow 1:10, and Red 1:1 Grass, Trees   Red 1:1 0.5   5mL      Shot Clinic Location:  [facility]  Ship to Location: [facility]  Billing Location: [facility]  Special Instructions:  None        Requester Signature  Ewa Greenfield MD

## 2023-09-21 DIAGNOSIS — J30.1 SEASONAL ALLERGIC RHINITIS DUE TO POLLEN: Primary | ICD-10-CM

## 2023-09-21 DIAGNOSIS — J30.81 ALLERGIC RHINITIS DUE TO ANIMALS: ICD-10-CM

## 2023-09-21 DIAGNOSIS — J30.89 ALLERGIC RHINITIS DUE TO MOLD: ICD-10-CM

## 2023-09-21 PROCEDURE — 95165 ANTIGEN THERAPY SERVICES: CPT | Performed by: ALLERGY & IMMUNOLOGY

## 2023-09-21 NOTE — PROGRESS NOTES
Allergy serums billed to Ted.     Vials billed below:    Vial Color   Content                      Vial Size Expiration Date  Green 1:1,000, Blue 1:100, Yellow 1:10, and Red 1:1  Cat, Molds  5mL each Green 12/21/23, Blue 3/21/24, Yellow & Red 9/21/24    Billed 30 units    Checked by Leo Betancourt / LPN        Signature  Leo Betancourt LPN

## 2023-09-22 DIAGNOSIS — J30.81 ALLERGIC RHINITIS DUE TO ANIMALS: ICD-10-CM

## 2023-09-22 DIAGNOSIS — J30.1 SEASONAL ALLERGIC RHINITIS DUE TO POLLEN: Primary | ICD-10-CM

## 2023-09-22 PROCEDURE — 95165 ANTIGEN THERAPY SERVICES: CPT | Performed by: ALLERGY & IMMUNOLOGY

## 2023-09-22 RX ORDER — EPINEPHRINE 0.3 MG/.3ML
0.3 INJECTION SUBCUTANEOUS PRN
Qty: 2 EACH | Refills: 2 | Status: SHIPPED | OUTPATIENT
Start: 2023-09-22

## 2023-09-22 NOTE — PROGRESS NOTES
Allergy serums billed to [facility].     Vials billed below:    Vial Color   Content                      Vial Size Expiration Date  Green 1:1,000, Blue 1:100, Yellow 1:10, and Red 1:1  Dog, Weeds  5mL each Green 12/22/23, Blue 3/22/24, Yellow & Red 9/22/24    Billed 30 units    Checked by Leo Betancourt / LPN        Signature  Leo Betancourt LPN

## 2023-09-28 DIAGNOSIS — J30.1 SEASONAL ALLERGIC RHINITIS DUE TO POLLEN: Primary | ICD-10-CM

## 2023-09-28 PROCEDURE — 95165 ANTIGEN THERAPY SERVICES: CPT | Performed by: ALLERGY & IMMUNOLOGY

## 2023-09-28 NOTE — PROGRESS NOTES
Allergy serums billed to Ted.     Vials billed below:    Vial Color   Content                      Vial Size Expiration Date  Green 1:1,000, Blue 1:100, Yellow 1:10, and Red 1:1  Grass, Trees  5mL each Green 12/28/23, Blue 3/28/24, Yellow & Red 9/28/24    Billed 30 units    Checked by Leo Betancourt / LPN        Signature  Leo Betancourt LPN

## 2023-10-03 NOTE — PROGRESS NOTES
Allergy serums received at [facility].    Vials received below:    Vial Color Content                      Vial Size Expiration Date  Red 1:1 Cat, Molds 5mL  09/21/2024  Red 1:1 Grass, Trees 5mL  09/28/2024  Red 1:1 Dog, Dust Mite 5mL  09/22/2024    Vial Color Content                      Vial Size Expiration Date  Yellow 1:10 Cat, Molds 5mL  09/21/2024  Yellow 1:10 Grass, Trees 5mL  09/28/2024  Yellow 1:10 Dog, Weeds 5mL  09/22/2024    Vial Color Content                      Vial Size Expiration Date  Blue 1:100 Cat, Molds 5mL  03/21/2024  Blue 1:100 Grass, Trees 5mL  03/28/2024  Blue 1:100 Dog, Weeds 5mL  03/22/2024    Vial Color Content                      Vial Size Expiration Date  Green 1:1,000 Cat, Molds 5mL  12/21/2023  Green 1:1,000 Grass, Trees 5mL  12/28/2023  Green 1:1,000 Dog, Weeds 5mL  12/22/2023      Karol QUEZADA RN, BSN, PHN

## 2023-10-10 ENCOUNTER — OFFICE VISIT (OUTPATIENT)
Dept: ALLERGY | Facility: CLINIC | Age: 33
End: 2023-10-10
Payer: COMMERCIAL

## 2023-10-10 VITALS — SYSTOLIC BLOOD PRESSURE: 146 MMHG | OXYGEN SATURATION: 99 % | HEART RATE: 77 BPM | DIASTOLIC BLOOD PRESSURE: 88 MMHG

## 2023-10-10 DIAGNOSIS — J30.89 ALLERGIC RHINITIS DUE TO MOLD: ICD-10-CM

## 2023-10-10 DIAGNOSIS — J30.1 SEASONAL ALLERGIC RHINITIS DUE TO POLLEN: Primary | ICD-10-CM

## 2023-10-10 DIAGNOSIS — J30.81 ALLERGIC RHINITIS DUE TO ANIMALS: ICD-10-CM

## 2023-10-10 DIAGNOSIS — H10.13 ALLERGIC CONJUNCTIVITIS, BILATERAL: ICD-10-CM

## 2023-10-10 PROCEDURE — 95180 RAPID DESENSITIZATION: CPT | Performed by: ALLERGY & IMMUNOLOGY

## 2023-10-10 NOTE — PROGRESS NOTES
Oswaldo Garcia was seen in the Allergy Clinic at [facility].      Oswaldo Garcia is a 33 year old Not  or  male who is seen today for his initial cluster immunotherapy visit. He has been struggling with nasal congestion but otherwise has not had any recent fevers or illness. He pre-medicated with cetirizine as directed prior to today's visit.    Past Medical History:   Diagnosis Date    Acute appendicitis with peritoneal abscess 02/11/09    D/C 02/13/09    Fracture of ankle, trimalleolar, closed      History reviewed. No pertinent family history.  Social History     Tobacco Use    Smoking status: Former     Packs/day: 0.50     Types: Cigarettes     Quit date: 8/5/2013     Years since quitting: 10.1    Smokeless tobacco: Never   Vaping Use    Vaping Use: Never used   Substance Use Topics    Alcohol use: Yes     Comment: occ    Drug use: No     Social History     Social History Narrative    ** Merged History Encounter **            Past medical, family, and social history were reviewed.        Current Outpatient Medications:     cetirizine (ZYRTEC) 10 MG tablet, Take 10 mg by mouth daily, Disp: , Rfl:     ORDER FOR ALLERGEN IMMUNOTHERAPY, Name of Mix: Mix #1  Mold, Cat Cat Hair, Standardized A.P. 10,000 BAU/mL, HS  2.0 ml  Alternaria Tenuis 1:10 w/v, HS  0.5 ml Epicoccum Nigrum 1:10 w/v, HS 0.5 ml Diluent: HSA qs to 5ml, Disp: 5 mL, Rfl: PRN    ORDER FOR ALLERGEN IMMUNOTHERAPY, Name of Mix: Mix #2  Dog, Weeds Dog Hair-Dander, UF  1:650 w/v, HS  1.0 ml  Kochia 1:20 w/v, HS 0.5 ml Nettle 1:20 w/v, HS 0.5 ml Plantain, English 1:20 w/v, HS 0.5 ml Ragweed, Mix (Giant, Short) 1:20 w/v, HS 0.5 ml Russian Thistle 1:20 w/v, HS 0.5 ml Sagebrush, Mugwort 1:20 w/v, HS 0.5 ml Sorrel, Sheep 1:20 w/v, HS 0.5 ml Diluent: HSA qs to 5ml, Disp: 5 mL, Rfl: PRN    ORDER FOR ALLERGEN IMMUNOTHERAPY, Name of Mix: Mix #3  Grass, Tree  Portillo, White 1:20 w/v, HS  0.5 ml Birch Mix PRW 1:20 w/v, HS  0.5 ml  Boxelder-Maple Mix BHR (Boxelder Hard Red) 1:20 w/v, HS  0.5 ml Fleming, Common 1:20 w/v, HS  0.5 ml Elm, American 1:20 w/v, HS  0.5 ml Oak Mix RVW 1:20 w/v, HS 0.5 ml Pine Mix 1:20 w/v, HS 0.5 ml [place] Tree, Black 1:20 w/v, HS 0.5 ml Jose Grass 1:20 w/v, HS 0.5 ml Harvey Grass (Std) 100,000 BAU/mL, HS 0.4 ml Diluent: HSA qs to 5ml, Disp: 5 mL, Rfl: PRN    EPINEPHrine (ANY BX GENERIC EQUIV) 0.3 MG/0.3ML injection 2-pack, Inject 0.3 mLs (0.3 mg) into the muscle as needed for anaphylaxis May repeat one time in 5-15 minutes if response to initial dose is inadequate. (Patient not taking: Reported on 10/10/2023), Disp: 2 each, Rfl: 2  No Known Allergies    EXAM:   BP (!) 146/88 (BP Location: Right arm, Patient Position: Sitting, Cuff Size: Adult Regular)   Pulse 77   SpO2 99%   Physical Exam  Vitals and nursing note reviewed.   Constitutional:       Appearance: Normal appearance.   HENT:      Head: Normocephalic and atraumatic.      Right Ear: External ear normal.      Left Ear: External ear normal.      Nose: No rhinorrhea.      Mouth/Throat:      Mouth: Mucous membranes are moist. No oral lesions.      Pharynx: Oropharynx is clear. Uvula midline. No posterior oropharyngeal erythema.   Eyes:      General: Lids are normal.      Extraocular Movements: Extraocular movements intact.      Conjunctiva/sclera: Conjunctivae normal.   Neck:      Comments: No asymmetry, masses, or scars  Cardiovascular:      Rate and Rhythm: Normal rate and regular rhythm.      Heart sounds: S1 normal and S2 normal.   Pulmonary:      Effort: Pulmonary effort is normal. No respiratory distress.      Breath sounds: Normal breath sounds and air entry.   Musculoskeletal:      Comments: No musculoskeletal defects appreciated   Skin:     General: Skin is warm and dry.      Findings: No lesion or rash.   Neurological:      General: No focal deficit present.      Mental Status: He is alert.   Psychiatric:         Mood and Affect: Mood and  affect normal.           WORKUP:  Cluster Immunotherapy    Cluster Allergen Immunotherapy:    After explaining risks and benefits, and obtaining verbal and written consent, we proceeded with cluster immunotherapy.     VISIT  VIAL COLOR/STRENGTH  DOSES TO BE GIVEN    1  GREEN (1:1000), BLUE (1:100)  GREEN 0.1, GREEN 0.2, GREEN 0.4, BLUE 0.1    2  BLUE (1:100), YELLOW (1:10)  BLUE 0.2, BLUE 0.4, YELLOW 0.05    3  YELLOW (1:10)  YELLOW 0.1, YELLOW 0.15, YELLOW 0.25    4  YELLOW (1:10)  YELLOW 0.35, YELLOW 0.5    5  RED (1:1)  RED 0.05, RED 0.1    6  RED (1:1)  RED 0.15, RED 0.2    7  RED (1:1)  RED 0.3, RED 0.4    8  RED (1:1)  RED 0.5        VISIT 1    Time Injection Given: 9:08  Green 1:1,000   Grass, Trees    0.1 mL  Green 1:1,000   Dog, Weeds    0.1 mL  Green 1:1,000   Cat, Molds    0.1 mL        Time Injection Given: 9:40  Green 1:1,000   Grass, Trees    0.2 mL  Green 1:1,000   Dog, Weeds    0.2 mL  Green 1:1,000   Cat, Molds    0.2 mL    Time Injection Given: 10:15  Green 1:1,000   Grass, Trees    0.4 mL  Green 1:1,000   Dog, Weeds    0.4 mL  Green 1:1,000   Cat, Molds    0.4 mL      Time Injection Given: 10:51  Blue 1:100   Grass, Trees    0.1 mL  Blue 1:100   Dog, Weeds    0.1 mL  Blue 1:100   Cat, Molds    0.1 mL      Start Time: 9:08  End Time: 11:21        VITALS   Time BP Pulse pOx Reaction Treatment   9:38 137/93 89 100% none N/a   10:12 148/96 88 98% none N/a   10:45 151/95 88 98% none N/a   11:21 153/93 90 99% none N/a       ASSESSMENT/PLAN:  Oswaldo Garcia is a 33 year old male here for cluster immunotherapy.    1. Seasonal allergic rhinitis due to pollen - Oswaldo tolerated today's procedure well without developing any signs or symptoms of an allergic reaction.    - return in 7-14 days to continue cluster protocol  - continue pre-medication with cetirizine as directed  - RAPID DESENSITIZATION    2. Allergic rhinitis due to animals    - RAPID DESENSITIZATION    3. Allergic rhinitis due to mold    -  RAPID DESENSITIZATION    4. Allergic conjunctivitis, bilateral    - RAPID DESENSITIZATION      Thank you for allowing me to participate in the care of Oswaldo Garcia.      Ewa Greenfield MD, FAAAAI  Allergy/Immunology  [facility]      Chart documentation done in part with Dragon Voice Recognition Software. Although reviewed after completion, some word and grammatical errors may remain.

## 2023-10-10 NOTE — PROGRESS NOTES
Prior to initiation of cluster immunotherapy RN ensured that patient was feeling healthy, has premedicated with Zyrtec or Allegra yesterday twice daily and this morning. RN also ensured that patient has unexpired Epi-Pen, had no new medication changes, and did not have a reaction after the last allergy shots were given. If the patient has asthma it is well-controlled. The patient has not been ill in the past 7 days. Patient was given allergy injections per cluster immunotherapy protocol 30 minutes apart and vital signs were monitored. Patient was monitored in clinic for 30 minutes after last injection was given and assessed by provider before discharging.     Deidre Cardenas RN

## 2023-10-10 NOTE — LETTER
10/10/2023         RE: Oswaldo Garcia  [address]  [place] [address]        Dear Colleague,    Thank you for referring your patient, Oswaldo Garcia, to the [facility]. Please see a copy of my visit note below.    Oswaldo Garcia was seen in the Allergy Clinic at [facility].      Oswaldo Garcia is a 33 year old Not  or  male who is seen today for his initial cluster immunotherapy visit. He has been struggling with nasal congestion but otherwise has not had any recent fevers or illness. He pre-medicated with cetirizine as directed prior to today's visit.    Past Medical History:   Diagnosis Date     Acute appendicitis with peritoneal abscess 02/11/09    D/C 02/13/09     Fracture of ankle, trimalleolar, closed      History reviewed. No pertinent family history.  Social History     Tobacco Use     Smoking status: Former     Packs/day: 0.50     Types: Cigarettes     Quit date: 8/5/2013     Years since quitting: 10.1     Smokeless tobacco: Never   Vaping Use     Vaping Use: Never used   Substance Use Topics     Alcohol use: Yes     Comment: occ     Drug use: No     Social History     Social History Narrative    ** Merged History Encounter **            Past medical, family, and social history were reviewed.        Current Outpatient Medications:      cetirizine (ZYRTEC) 10 MG tablet, Take 10 mg by mouth daily, Disp: , Rfl:      ORDER FOR ALLERGEN IMMUNOTHERAPY, Name of Mix: Mix #1  Mold, Cat Cat Hair, Standardized A.P. 10,000 BAU/mL, HS  2.0 ml  Alternaria Tenuis 1:10 w/v, HS  0.5 ml Epicoccum Nigrum 1:10 w/v, HS 0.5 ml Diluent: HSA qs to 5ml, Disp: 5 mL, Rfl: PRN     ORDER FOR ALLERGEN IMMUNOTHERAPY, Name of Mix: Mix #2  Dog, Weeds Dog Hair-Dander, UF  1:650 w/v, HS  1.0 ml  Kochia 1:20 w/v, HS 0.5 ml Nettle 1:20 w/v, HS 0.5 ml Plantain, English 1:20 w/v, HS 0.5 ml Ragweed, Mix (Giant, Short) 1:20 w/v, HS 0.5 ml Russian Thistle 1:20 w/v,  HS 0.5 ml Sagebrush, Mugwort 1:20 w/v, HS 0.5 ml Sorrel, Sheep 1:20 w/v, HS 0.5 ml Diluent: HSA qs to 5ml, Disp: 5 mL, Rfl: PRN     ORDER FOR ALLERGEN IMMUNOTHERAPY, Name of Mix: Mix #3  Grass, Tree  Portillo, White 1:20 w/v, HS  0.5 ml Birch Mix PRW 1:20 w/v, HS  0.5 ml Boxelder-Maple Mix BHR (Boxelder Hard Red) 1:20 w/v, HS  0.5 ml Stewart, Common 1:20 w/v, HS  0.5 ml Elm, American 1:20 w/v, HS  0.5 ml Oak Mix RVW 1:20 w/v, HS 0.5 ml Pine Mix 1:20 w/v, HS 0.5 ml [place] Tree, Black 1:20 w/v, HS 0.5 ml Jose Grass 1:20 w/v, HS 0.5 ml Harvey Grass (Std) 100,000 BAU/mL, HS 0.4 ml Diluent: HSA qs to 5ml, Disp: 5 mL, Rfl: PRN     EPINEPHrine (ANY BX GENERIC EQUIV) 0.3 MG/0.3ML injection 2-pack, Inject 0.3 mLs (0.3 mg) into the muscle as needed for anaphylaxis May repeat one time in 5-15 minutes if response to initial dose is inadequate. (Patient not taking: Reported on 10/10/2023), Disp: 2 each, Rfl: 2  No Known Allergies    EXAM:   BP (!) 146/88 (BP Location: Right arm, Patient Position: Sitting, Cuff Size: Adult Regular)   Pulse 77   SpO2 99%   Physical Exam  Vitals and nursing note reviewed.   Constitutional:       Appearance: Normal appearance.   HENT:      Head: Normocephalic and atraumatic.      Right Ear: External ear normal.      Left Ear: External ear normal.      Nose: No rhinorrhea.      Mouth/Throat:      Mouth: Mucous membranes are moist. No oral lesions.      Pharynx: Oropharynx is clear. Uvula midline. No posterior oropharyngeal erythema.   Eyes:      General: Lids are normal.      Extraocular Movements: Extraocular movements intact.      Conjunctiva/sclera: Conjunctivae normal.   Neck:      Comments: No asymmetry, masses, or scars  Cardiovascular:      Rate and Rhythm: Normal rate and regular rhythm.      Heart sounds: S1 normal and S2 normal.   Pulmonary:      Effort: Pulmonary effort is normal. No respiratory distress.      Breath sounds: Normal breath sounds and air entry.   Musculoskeletal:       Comments: No musculoskeletal defects appreciated   Skin:     General: Skin is warm and dry.      Findings: No lesion or rash.   Neurological:      General: No focal deficit present.      Mental Status: He is alert.   Psychiatric:         Mood and Affect: Mood and affect normal.           WORKUP:  Cluster Immunotherapy    Cluster Allergen Immunotherapy:    After explaining risks and benefits, and obtaining verbal and written consent, we proceeded with cluster immunotherapy.     VISIT  VIAL COLOR/STRENGTH  DOSES TO BE GIVEN    1  GREEN (1:1000), BLUE (1:100)  GREEN 0.1, GREEN 0.2, GREEN 0.4, BLUE 0.1    2  BLUE (1:100), YELLOW (1:10)  BLUE 0.2, BLUE 0.4, YELLOW 0.05    3  YELLOW (1:10)  YELLOW 0.1, YELLOW 0.15, YELLOW 0.25    4  YELLOW (1:10)  YELLOW 0.35, YELLOW 0.5    5  RED (1:1)  RED 0.05, RED 0.1    6  RED (1:1)  RED 0.15, RED 0.2    7  RED (1:1)  RED 0.3, RED 0.4    8  RED (1:1)  RED 0.5        VISIT 1    Time Injection Given: 9:08  Green 1:1,000   Grass, Trees    0.1 mL  Green 1:1,000   Dog, Weeds    0.1 mL  Green 1:1,000   Cat, Molds    0.1 mL        Time Injection Given: 9:40  Green 1:1,000   Grass, Trees    0.2 mL  Green 1:1,000   Dog, Weeds    0.2 mL  Green 1:1,000   Cat, Molds    0.2 mL    Time Injection Given: 10:15  Green 1:1,000   Grass, Trees    0.4 mL  Green 1:1,000   Dog, Weeds    0.4 mL  Green 1:1,000   Cat, Molds    0.4 mL      Time Injection Given: 10:51  Blue 1:100   Grass, Trees    0.1 mL  Blue 1:100   Dog, Weeds    0.1 mL  Blue 1:100   Cat, Molds    0.1 mL      Start Time: 9:08  End Time: 11:21        VITALS   Time BP Pulse pOx Reaction Treatment   9:38 137/93 89 100% none N/a   10:12 148/96 88 98% none N/a   10:45 151/95 88 98% none N/a   11:21 153/93 90 99% none N/a       ASSESSMENT/PLAN:  Oswaldo Garcia is a 33 year old male here for cluster immunotherapy.    1. Seasonal allergic rhinitis due to pollen - Oswaldo tolerated today's procedure well without developing any signs or symptoms of an  allergic reaction.    - return in 7-14 days to continue cluster protocol  - continue pre-medication with cetirizine as directed  - RAPID DESENSITIZATION    2. Allergic rhinitis due to animals    - RAPID DESENSITIZATION    3. Allergic rhinitis due to mold    - RAPID DESENSITIZATION    4. Allergic conjunctivitis, bilateral    - RAPID DESENSITIZATION      Thank you for allowing me to participate in the care of Oswaldo Garcia.      Ewa Greenfield MD, FAAAAI  Allergy/Immunology  [facility]      Chart documentation done in part with Dragon Voice Recognition Software. Although reviewed after completion, some word and grammatical errors may remain.    Prior to initiation of cluster immunotherapy RN ensured that patient was feeling healthy, has premedicated with Zyrtec or Allegra yesterday twice daily and this morning. RN also ensured that patient has unexpired Epi-Pen, had no new medication changes, and did not have a reaction after the last allergy shots were given. If the patient has asthma it is well-controlled. The patient has not been ill in the past 7 days. Patient was given allergy injections per cluster immunotherapy protocol 30 minutes apart and vital signs were monitored. Patient was monitored in clinic for 30 minutes after last injection was given and assessed by provider before discharging.     Deidre Cardenas RN      Again, thank you for allowing me to participate in the care of your patient.        Sincerely,        Ewa Greenfield MD

## 2023-10-24 ENCOUNTER — OFFICE VISIT (OUTPATIENT)
Dept: ALLERGY | Facility: CLINIC | Age: 33
End: 2023-10-24
Payer: COMMERCIAL

## 2023-10-24 VITALS — SYSTOLIC BLOOD PRESSURE: 135 MMHG | HEART RATE: 86 BPM | DIASTOLIC BLOOD PRESSURE: 81 MMHG | OXYGEN SATURATION: 98 %

## 2023-10-24 DIAGNOSIS — J30.81 ALLERGIC RHINITIS DUE TO ANIMALS: ICD-10-CM

## 2023-10-24 DIAGNOSIS — J30.89 ALLERGIC RHINITIS DUE TO MOLD: ICD-10-CM

## 2023-10-24 DIAGNOSIS — H10.13 ALLERGIC CONJUNCTIVITIS, BILATERAL: ICD-10-CM

## 2023-10-24 DIAGNOSIS — J30.1 SEASONAL ALLERGIC RHINITIS DUE TO POLLEN: Primary | ICD-10-CM

## 2023-10-24 PROCEDURE — 95180 RAPID DESENSITIZATION: CPT | Performed by: ALLERGY & IMMUNOLOGY

## 2023-10-24 NOTE — LETTER
10/24/2023         RE: Oswaldo Garcia  [address] [facility] [address]        Dear Colleague,    Thank you for referring your patient, Oswaldo Garcia, to the [facility]. Please see a copy of my visit note below.    Oswaldo Garcia was seen in the Allergy Clinic at [facility].      Oswaldo Garcia is a 33 year old Not  or  male who is seen today for cluster immunotherapy. He had no adverse reactions after his last visit. He pre-medicated with cetirizine as directed prior to today's visit.     Past Medical History:   Diagnosis Date     Acute appendicitis with peritoneal abscess 02/11/09    D/C 02/13/09     Fracture of ankle, trimalleolar, closed      No family history on file.  Social History     Tobacco Use     Smoking status: Former     Packs/day: .5     Types: Cigarettes     Quit date: 8/5/2013     Years since quitting: 10.2     Smokeless tobacco: Never   Vaping Use     Vaping Use: Never used   Substance Use Topics     Alcohol use: Yes     Comment: occ     Drug use: No     Social History     Social History Narrative    ** Merged History Encounter **            Past medical, family, and social history were reviewed.        Current Outpatient Medications:      cetirizine (ZYRTEC) 10 MG tablet, Take 10 mg by mouth daily, Disp: , Rfl:      EPINEPHrine (ANY BX GENERIC EQUIV) 0.3 MG/0.3ML injection 2-pack, Inject 0.3 mLs (0.3 mg) into the muscle as needed for anaphylaxis May repeat one time in 5-15 minutes if response to initial dose is inadequate., Disp: 2 each, Rfl: 2     ORDER FOR ALLERGEN IMMUNOTHERAPY, Name of Mix: Mix #1  Mold, Cat Cat Hair, Standardized A.P. 10,000 BAU/mL, HS  2.0 ml  Alternaria Tenuis 1:10 w/v, HS  0.5 ml Epicoccum Nigrum 1:10 w/v, HS 0.5 ml Diluent: HSA qs to 5ml, Disp: 5 mL, Rfl: PRN     ORDER FOR ALLERGEN IMMUNOTHERAPY, Name of Mix: Mix #2  Dog, Weeds Dog Hair-Dander, UF  1:650 w/v, HS  1.0 ml  Kochia 1:20 w/v, HS  0.5 ml Nettle 1:20 w/v, HS 0.5 ml Plantain, English 1:20 w/v, HS 0.5 ml Ragweed, Mix (Giant, Short) 1:20 w/v, HS 0.5 ml Russian Thistle 1:20 w/v, HS 0.5 ml Sagebrush, Mugwort 1:20 w/v, HS 0.5 ml Sorrel, Sheep 1:20 w/v, HS 0.5 ml Diluent: HSA qs to 5ml, Disp: 5 mL, Rfl: PRN     ORDER FOR ALLERGEN IMMUNOTHERAPY, Name of Mix: Mix #3  Grass, Tree  Portillo, White 1:20 w/v, HS  0.5 ml Birch Mix PRW 1:20 w/v, HS  0.5 ml Boxelder-Maple Mix BHR (Boxelder Hard Red) 1:20 w/v, HS  0.5 ml Bol[place], Common 1:20 w/v, HS  0.5 ml Elm, American 1:20 w/v, HS  0.5 ml Oak Mix RVW 1:20 w/v, HS 0.5 ml Pine Mix 1:20 w/v, HS 0.5 ml Alicia Tree, Black 1:20 w/v, HS 0.5 ml Jose Grass 1:20 w/v, HS 0.5 ml Harvey Grass (Std) 100,000 BAU/mL, HS 0.4 ml Diluent: HSA qs to 5ml, Disp: 5 mL, Rfl: PRN  No Known Allergies    EXAM:   /81 (BP Location: Left arm, Patient Position: Sitting, Cuff Size: Adult Regular)   Pulse 86   SpO2 98%   Physical Exam  Vitals and nursing note reviewed.   Constitutional:       Appearance: Normal appearance.   HENT:      Head: Normocephalic and atraumatic.      Right Ear: External ear normal.      Left Ear: External ear normal.      Nose: No rhinorrhea.      Mouth/Throat:      Mouth: Mucous membranes are moist. No oral lesions.      Pharynx: Oropharynx is clear. Uvula midline. No posterior oropharyngeal erythema.   Eyes:      General: Lids are normal.      Extraocular Movements: Extraocular movements intact.      Conjunctiva/sclera: Conjunctivae normal.   Neck:      Comments: No asymmetry, masses, or scars  Cardiovascular:      Rate and Rhythm: Normal rate and regular rhythm.      Heart sounds: S1 normal and S2 normal.   Pulmonary:      Effort: Pulmonary effort is normal. No respiratory distress.      Breath sounds: Normal breath sounds and air entry.   Musculoskeletal:      Comments: No musculoskeletal defects appreciated   Skin:     General: Skin is warm and dry.      Findings: No lesion or rash.   Neurological:       General: No focal deficit present.      Mental Status: He is alert.   Psychiatric:         Mood and Affect: Mood and affect normal.           WORKUP:  Cluster Immunotherapy    Cluster Allergen Immunotherapy:    After explaining risks and benefits, and obtaining verbal and written consent, we proceeded with cluster immunotherapy.     VISIT  VIAL COLOR/STRENGTH  DOSES TO BE GIVEN    1  GREEN (1:1000), BLUE (1:100)  GREEN 0.1, GREEN 0.2, GREEN 0.4, BLUE 0.1    2  BLUE (1:100), YELLOW (1:10)  BLUE 0.2, BLUE 0.4, YELLOW 0.05    3  YELLOW (1:10)  YELLOW 0.1, YELLOW 0.15, YELLOW 0.25    4  YELLOW (1:10)  YELLOW 0.35, YELLOW 0.5    5  RED (1:1)  RED 0.05, RED 0.1    6  RED (1:1)  RED 0.15, RED 0.2    7  RED (1:1)  RED 0.3, RED 0.4    8  RED (1:1)  RED 0.5        VISIT 2    After explaining risks and benefits, and obtaining verbal and written consent, we proceeded with cluster immunotherapy.    Time Injection Given: 9:03  Blue 1:100   Grass, Trees    0.2 mL  Blue 1:100   Dog, Weeds    0.2 mL  Blue 1:100   Cat, Mold    0.2 mL      Time Injection Given: 9:36  Blue 1:100   Grass, Trees    0.4 mL  Blue 1:100   Dog, Weeds    0.4 mL  Blue 1:100   Cat, Molds    0.4 mL      Time Injection Given: 10:25  Yellow 1:10   Grass, Trees    0.05 mL  Yellow 1:10   Dog, Weeds    0.05 mL  Yellow 1:10   Cat, Molds    0.05 mL      Start Time: 9:03  End Time: 10:55        VITALS   Time BP Pulse pOx Reaction Treatment   9:32 136/93 80 97% none N/a   10:15 141/89 74 98% none N/a   10:55 147/96 78 99% none N/a     ASSESSMENT/PLAN:  Oswaldo Garcia is a 33 year old male here for cluster immunotherapy.    1. Seasonal allergic rhinitis due to pollen - Oswaldo tolerated today's procedure well without developing any signs or symptoms of an adverse reaction    - return in 7-14 days to continue cluster protocol  - continue pre-medication with cetirizine as directed  - RAPID DESENSITIZATION    2. Allergic rhinitis due to animals    - RAPID  DESENSITIZATION    3. Allergic rhinitis due to mold    - RAPID DESENSITIZATION    4. Allergic conjunctivitis, bilateral    - RAPID DESENSITIZATION      Thank you for allowing me to participate in the care of Oswaldo Garcia.      Ewa Greenfield MD, FAAAA  Allergy/Immunology  [facility]      Chart documentation done in part with Dragon Voice Recognition Software. Although reviewed after completion, some word and grammatical errors may remain.      Prior to initiation of cluster immunotherapy RN ensured that patient was feeling healthy, has premedicated with Zyrtec or Allegra yesterday twice daily and this morning. RN also ensured that patient has unexpired Epi-Pen, had no new medication changes, and did not have a reaction after the last allergy shots were given. If the patient has asthma it is well-controlled. The patient has not been ill in the past 7 days. Patient was given allergy injections per cluster immunotherapy protocol 30 minutes apart and vital signs were monitored. Patient was monitored in clinic for 30 minutes after last injection was given and assessed by provider before discharging.     Deidre Cardenas, RN      Again, thank you for allowing me to participate in the care of your patient.        Sincerely,        Ewa Greenfield MD

## 2023-10-24 NOTE — PROGRESS NOTES
Oswaldo Garcia was seen in the Allergy Clinic at [facility].      sOwaldo Garcia is a 33 year old Not  or  male who is seen today for cluster immunotherapy. He had no adverse reactions after his last visit. He pre-medicated with cetirizine as directed prior to today's visit.     Past Medical History:   Diagnosis Date    Acute appendicitis with peritoneal abscess 02/11/09    D/C 02/13/09    Fracture of ankle, trimalleolar, closed      No family history on file.  Social History     Tobacco Use    Smoking status: Former     Packs/day: .5     Types: Cigarettes     Quit date: 8/5/2013     Years since quitting: 10.2    Smokeless tobacco: Never   Vaping Use    Vaping Use: Never used   Substance Use Topics    Alcohol use: Yes     Comment: occ    Drug use: No     Social History     Social History Narrative    ** Merged History Encounter **            Past medical, family, and social history were reviewed.        Current Outpatient Medications:     cetirizine (ZYRTEC) 10 MG tablet, Take 10 mg by mouth daily, Disp: , Rfl:     EPINEPHrine (ANY BX GENERIC EQUIV) 0.3 MG/0.3ML injection 2-pack, Inject 0.3 mLs (0.3 mg) into the muscle as needed for anaphylaxis May repeat one time in 5-15 minutes if response to initial dose is inadequate., Disp: 2 each, Rfl: 2    ORDER FOR ALLERGEN IMMUNOTHERAPY, Name of Mix: Mix #1  Mold, Cat Cat Hair, Standardized A.P. 10,000 BAU/mL, HS  2.0 ml  Alternaria Tenuis 1:10 w/v, HS  0.5 ml Epicoccum Nigrum 1:10 w/v, HS 0.5 ml Diluent: HSA qs to 5ml, Disp: 5 mL, Rfl: PRN    ORDER FOR ALLERGEN IMMUNOTHERAPY, Name of Mix: Mix #2  Dog, Weeds Dog Hair-Dander, UF  1:650 w/v, HS  1.0 ml  Kochia 1:20 w/v, HS 0.5 ml Nettle 1:20 w/v, HS 0.5 ml Plantain, English 1:20 w/v, HS 0.5 ml Ragweed, Mix (Giant, Short) 1:20 w/v, HS 0.5 ml Russian Thistle 1:20 w/v, HS 0.5 ml Sagebrush, Mugwort 1:20 w/v, HS 0.5 ml Sorrel, Sheep 1:20 w/v, HS 0.5 ml Diluent: HSA qs to 5ml, Disp: 5 mL,  Rfl: PRN    ORDER FOR ALLERGEN IMMUNOTHERAPY, Name of Mix: Mix #3  Grass, Tree  Portillo, White 1:20 w/v, HS  0.5 ml Birch Mix PRW 1:20 w/v, HS  0.5 ml Boxelder-Maple Mix BHR (Boxelder Hard Red) 1:20 w/v, HS  0.5 ml Faulk, Common 1:20 w/v, HS  0.5 ml Elm, American 1:20 w/v, HS  0.5 ml Oak Mix RVW 1:20 w/v, HS 0.5 ml Pine Mix 1:20 w/v, HS 0.5 ml [place] Tree, Black 1:20 w/v, HS 0.5 ml Jose Grass 1:20 w/v, HS 0.5 ml Harvey Grass (Std) 100,000 BAU/mL, HS 0.4 ml Diluent: HSA qs to 5ml, Disp: 5 mL, Rfl: PRN  No Known Allergies    EXAM:   /81 (BP Location: Left arm, Patient Position: Sitting, Cuff Size: Adult Regular)   Pulse 86   SpO2 98%   Physical Exam  Vitals and nursing note reviewed.   Constitutional:       Appearance: Normal appearance.   HENT:      Head: Normocephalic and atraumatic.      Right Ear: External ear normal.      Left Ear: External ear normal.      Nose: No rhinorrhea.      Mouth/Throat:      Mouth: Mucous membranes are moist. No oral lesions.      Pharynx: Oropharynx is clear. Uvula midline. No posterior oropharyngeal erythema.   Eyes:      General: Lids are normal.      Extraocular Movements: Extraocular movements intact.      Conjunctiva/sclera: Conjunctivae normal.   Neck:      Comments: No asymmetry, masses, or scars  Cardiovascular:      Rate and Rhythm: Normal rate and regular rhythm.      Heart sounds: S1 normal and S2 normal.   Pulmonary:      Effort: Pulmonary effort is normal. No respiratory distress.      Breath sounds: Normal breath sounds and air entry.   Musculoskeletal:      Comments: No musculoskeletal defects appreciated   Skin:     General: Skin is warm and dry.      Findings: No lesion or rash.   Neurological:      General: No focal deficit present.      Mental Status: He is alert.   Psychiatric:         Mood and Affect: Mood and affect normal.           WORKUP:  Cluster Immunotherapy    Cluster Allergen Immunotherapy:    After explaining risks and benefits, and obtaining  verbal and written consent, we proceeded with cluster immunotherapy.     VISIT  VIAL COLOR/STRENGTH  DOSES TO BE GIVEN    1  GREEN (1:1000), BLUE (1:100)  GREEN 0.1, GREEN 0.2, GREEN 0.4, BLUE 0.1    2  BLUE (1:100), YELLOW (1:10)  BLUE 0.2, BLUE 0.4, YELLOW 0.05    3  YELLOW (1:10)  YELLOW 0.1, YELLOW 0.15, YELLOW 0.25    4  YELLOW (1:10)  YELLOW 0.35, YELLOW 0.5    5  RED (1:1)  RED 0.05, RED 0.1    6  RED (1:1)  RED 0.15, RED 0.2    7  RED (1:1)  RED 0.3, RED 0.4    8  RED (1:1)  RED 0.5        VISIT 2    After explaining risks and benefits, and obtaining verbal and written consent, we proceeded with cluster immunotherapy.    Time Injection Given: 9:03  Blue 1:100   Grass, Trees    0.2 mL  Blue 1:100   Dog, Weeds    0.2 mL  Blue 1:100   Cat, Mold    0.2 mL      Time Injection Given: 9:36  Blue 1:100   Grass, Trees    0.4 mL  Blue 1:100   Dog, Weeds    0.4 mL  Blue 1:100   Cat, Molds    0.4 mL      Time Injection Given: 10:25  Yellow 1:10   Grass, Trees    0.05 mL  Yellow 1:10   Dog, Weeds    0.05 mL  Yellow 1:10   Cat, Molds    0.05 mL      Start Time: 9:03  End Time: 10:55        VITALS   Time BP Pulse pOx Reaction Treatment   9:32 136/93 80 97% none N/a   10:15 141/89 74 98% none N/a   10:55 147/96 78 99% none N/a     ASSESSMENT/PLAN:  Oswaldo Garcia is a 33 year old male here for cluster immunotherapy.    1. Seasonal allergic rhinitis due to pollen - Oswaldo tolerated today's procedure well without developing any signs or symptoms of an adverse reaction    - return in 7-14 days to continue cluster protocol  - continue pre-medication with cetirizine as directed  - RAPID DESENSITIZATION    2. Allergic rhinitis due to animals    - RAPID DESENSITIZATION    3. Allergic rhinitis due to mold    - RAPID DESENSITIZATION    4. Allergic conjunctivitis, bilateral    - RAPID DESENSITIZATION      Thank you for allowing me to participate in the care of Oswaldo Garcia.      Ewa Greenfield MD, FAAAAI  Allergy/Immunology  [facility]  [facility]      Chart documentation done in part with Dragon Voice Recognition Software. Although reviewed after completion, some word and grammatical errors may remain.

## 2023-10-31 ENCOUNTER — OFFICE VISIT (OUTPATIENT)
Dept: ALLERGY | Facility: CLINIC | Age: 33
End: 2023-10-31
Payer: COMMERCIAL

## 2023-10-31 VITALS — HEART RATE: 90 BPM | SYSTOLIC BLOOD PRESSURE: 155 MMHG | DIASTOLIC BLOOD PRESSURE: 94 MMHG | OXYGEN SATURATION: 98 %

## 2023-10-31 DIAGNOSIS — J30.89 ALLERGIC RHINITIS DUE TO MOLD: ICD-10-CM

## 2023-10-31 DIAGNOSIS — J30.81 ALLERGIC RHINITIS DUE TO ANIMALS: ICD-10-CM

## 2023-10-31 DIAGNOSIS — J30.1 SEASONAL ALLERGIC RHINITIS DUE TO POLLEN: Primary | ICD-10-CM

## 2023-10-31 DIAGNOSIS — H10.13 ALLERGIC CONJUNCTIVITIS, BILATERAL: ICD-10-CM

## 2023-10-31 PROCEDURE — 95180 RAPID DESENSITIZATION: CPT | Performed by: ALLERGY & IMMUNOLOGY

## 2023-10-31 NOTE — LETTER
10/31/2023         RE: Oswaldo Garcia  [address]  [facility] [address]        Dear Colleague,    Thank you for referring your patient, Oswaldo Garcia, to the [facility]. Please see a copy of my visit note below.    Oswaldo Garcia was seen in the Allergy Clinic at [facility].      Oswaldo Garcia is a 33 year old Not  or  male who is seen today for a follow-up visit.    Past Medical History:   Diagnosis Date     Acute appendicitis with peritoneal abscess 02/11/09    D/C 02/13/09     Fracture of ankle, trimalleolar, closed      No family history on file.  Social History     Tobacco Use     Smoking status: Former     Packs/day: .5     Types: Cigarettes     Quit date: 8/5/2013     Years since quitting: 10.2     Smokeless tobacco: Never   Vaping Use     Vaping Use: Never used   Substance Use Topics     Alcohol use: Yes     Comment: occ     Drug use: No     Social History     Social History Narrative    ** Merged History Encounter **            Past medical, family, and social history were reviewed.        Current Outpatient Medications:      cetirizine (ZYRTEC) 10 MG tablet, Take 10 mg by mouth daily, Disp: , Rfl:      EPINEPHrine (ANY BX GENERIC EQUIV) 0.3 MG/0.3ML injection 2-pack, Inject 0.3 mLs (0.3 mg) into the muscle as needed for anaphylaxis May repeat one time in 5-15 minutes if response to initial dose is inadequate., Disp: 2 each, Rfl: 2     ORDER FOR ALLERGEN IMMUNOTHERAPY, Name of Mix: Mix #1  Mold, Cat Cat Hair, Standardized A.P. 10,000 BAU/mL, HS  2.0 ml  Alternaria Tenuis 1:10 w/v, HS  0.5 ml Epicoccum Nigrum 1:10 w/v, HS 0.5 ml Diluent: HSA qs to 5ml, Disp: 5 mL, Rfl: PRN     ORDER FOR ALLERGEN IMMUNOTHERAPY, Name of Mix: Mix #2  Dog, Weeds Dog Hair-Dander, UF  1:650 w/v, HS  1.0 ml  Kochia 1:20 w/v, HS 0.5 ml Nettle 1:20 w/v, HS 0.5 ml Plantain, English 1:20 w/v, HS 0.5 ml Ragweed, Mix (Giant, Short) 1:20 w/v, HS 0.5 ml  Russian Thistle 1:20 w/v, HS 0.5 ml Sagebrush, Mugwort 1:20 w/v, HS 0.5 ml Sorrel, Sheep 1:20 w/v, HS 0.5 ml Diluent: HSA qs to 5ml, Disp: 5 mL, Rfl: PRN     ORDER FOR ALLERGEN IMMUNOTHERAPY, Name of Mix: Mix #3  Grass, Tree  Portillo, White 1:20 w/v, HS  0.5 ml Birch Mix PRW 1:20 w/v, HS  0.5 ml Boxelder-Maple Mix BHR (Boxelder Hard Red) 1:20 w/v, HS  0.5 ml [place], Common 1:20 w/v, HS  0.5 ml Elm, American 1:20 w/v, HS  0.5 ml Oak Mix RVW 1:20 w/v, HS 0.5 ml Pine Mix 1:20 w/v, HS 0.5 ml [place] Tree, Black 1:20 w/v, HS 0.5 ml Jose Grass 1:20 w/v, HS 0.5 ml Harvey Grass (Std) 100,000 BAU/mL, HS 0.4 ml Diluent: HSA qs to 5ml, Disp: 5 mL, Rfl: PRN  No Known Allergies    EXAM:   There were no vitals taken for this visit.  Physical Exam      WORKUP:  {ALLERGYWORKUP:049584}    ASSESSMENT/PLAN:  Oswaldo Garcia is a 33 year old male ***    ***    Follow-up in ***      Thank you for allowing me to participate in the care of Oswaldo Garcia.      A total of *** minutes, outside of separately billable procedures and injections, was spent on the day of the encounter performing chart review, history and exam, documentation, and counseling and coordination of care as noted above.      Ewa Greenfield MD, FAAAAI  Allergy/Immunology  [facility]      Chart documentation done in part with Dragon Voice Recognition Software. Although reviewed after completion, some word and grammatical errors may remain.    Oswaldo Garcia was seen in the Allergy Clinic at [facility].      Oswaldo Garcia is a 33 year old Not  or  male who is seen today for cluster immunotherapy. He had no adverse reactions after his last visit. He pre-medicated with cetirizine as directed prior to today's visit.      Past Medical History:   Diagnosis Date     Acute appendicitis with peritoneal abscess 02/11/09    D/C 02/13/09     Fracture of  ankle, trimalleolar, closed      No family history on file.  Social History     Tobacco Use     Smoking status: Former     Packs/day: .5     Types: Cigarettes     Quit date: 8/5/2013     Years since quitting: 10.2     Smokeless tobacco: Never   Vaping Use     Vaping Use: Never used   Substance Use Topics     Alcohol use: Yes     Comment: occ     Drug use: No     Social History     Social History Narrative    ** Merged History Encounter **            Past medical, family, and social history were reviewed.        Current Outpatient Medications:      cetirizine (ZYRTEC) 10 MG tablet, Take 10 mg by mouth daily, Disp: , Rfl:      EPINEPHrine (ANY BX GENERIC EQUIV) 0.3 MG/0.3ML injection 2-pack, Inject 0.3 mLs (0.3 mg) into the muscle as needed for anaphylaxis May repeat one time in 5-15 minutes if response to initial dose is inadequate., Disp: 2 each, Rfl: 2     ORDER FOR ALLERGEN IMMUNOTHERAPY, Name of Mix: Mix #1  Mold, Cat Cat Hair, Standardized A.P. 10,000 BAU/mL, HS  2.0 ml  Alternaria Tenuis 1:10 w/v, HS  0.5 ml Epicoccum Nigrum 1:10 w/v, HS 0.5 ml Diluent: HSA qs to 5ml, Disp: 5 mL, Rfl: PRN     ORDER FOR ALLERGEN IMMUNOTHERAPY, Name of Mix: Mix #2  Dog, Weeds Dog Hair-Dander, UF  1:650 w/v, HS  1.0 ml  Kochia 1:20 w/v, HS 0.5 ml Nettle 1:20 w/v, HS 0.5 ml Plantain, English 1:20 w/v, HS 0.5 ml Ragweed, Mix (Giant, Short) 1:20 w/v, HS 0.5 ml Russian Thistle 1:20 w/v, HS 0.5 ml Sagebrush, Mugwort 1:20 w/v, HS 0.5 ml Sorrel, Sheep 1:20 w/v, HS 0.5 ml Diluent: HSA qs to 5ml, Disp: 5 mL, Rfl: PRN     ORDER FOR ALLERGEN IMMUNOTHERAPY, Name of Mix: Mix #3  Grass, Tree  Portillo, White 1:20 w/v, HS  0.5 ml Birch Mix PRW 1:20 w/v, HS  0.5 ml Boxelder-Maple Mix BHR (Boxelder Hard Red) 1:20 w/v, HS  0.5 ml [place], Common 1:20 w/v, HS  0.5 ml Elm, American 1:20 w/v, HS  0.5 ml Oak Mix RVW 1:20 w/v, HS 0.5 ml Pine Mix 1:20 w/v, HS 0.5 ml [place] Tree, Black 1:20 w/v, HS 0.5 ml Jose Grass 1:20 w/v, HS 0.5 ml Harvey Grass  (Std) 100,000 BAU/mL, HS 0.4 ml Diluent: HSA qs to 5ml, Disp: 5 mL, Rfl: PRN  No Known Allergies    EXAM:   There were no vitals taken for this visit.  Physical Exam  Vitals and nursing note reviewed.   Constitutional:       Appearance: Normal appearance.   HENT:      Head: Normocephalic and atraumatic.      Right Ear: External ear normal.      Left Ear: External ear normal.      Nose: No rhinorrhea.      Mouth/Throat:      Mouth: Mucous membranes are moist. No oral lesions.      Pharynx: Oropharynx is clear. Uvula midline. No posterior oropharyngeal erythema.   Eyes:      General: Lids are normal.      Extraocular Movements: Extraocular movements intact.      Conjunctiva/sclera: Conjunctivae normal.   Neck:      Comments: No asymmetry, masses, or scars  Cardiovascular:      Rate and Rhythm: Normal rate and regular rhythm.      Heart sounds: S1 normal and S2 normal. No murmur heard.  Pulmonary:      Effort: Pulmonary effort is normal. No respiratory distress.      Breath sounds: Normal breath sounds and air entry.   Musculoskeletal:      Comments: No musculoskeletal defects appreciated   Skin:     General: Skin is warm and dry.      Findings: No lesion or rash.   Neurological:      General: No focal deficit present.      Mental Status: He is alert.   Psychiatric:         Mood and Affect: Mood and affect normal.           WORKUP:  Cluster Immunotherapy    Cluster Allergen Immunotherapy:    After explaining risks and benefits, and obtaining verbal and written consent, we proceeded with cluster immunotherapy.     VISIT  VIAL COLOR/STRENGTH  DOSES TO BE GIVEN    1  GREEN (1:1000), BLUE (1:100)  GREEN 0.1, GREEN 0.2, GREEN 0.4, BLUE 0.1    2  BLUE (1:100), YELLOW (1:10)  BLUE 0.2, BLUE 0.4, YELLOW 0.05    3  YELLOW (1:10)  YELLOW 0.1, YELLOW 0.15, YELLOW 0.25    4  YELLOW (1:10)  YELLOW 0.35, YELLOW 0.5    5  RED (1:1)  RED 0.05, RED 0.1    6  RED (1:1)  RED 0.15, RED 0.2    7  RED (1:1)  RED 0.3, RED 0.4    8  RED (1:1)   RED 0.5        VISIT 3    Time Injection Given: 9:00  Yellow 1:10   Grass, Trees    0.1 mL  Yellow 1:10   Dog, Weeds    0.1 mL  Yellow 1:10   Cat, Molds    0.1 mL  Time Injection Given: 9:39  Yellow 1:10   Grass, Trees    0.15 mL  Yellow 1:10   Dog, Weeds    0.15 mL  Yellow 1:10   Cat, Molds    0.15 mL    Time Injection Given: 10:15  Yellow 1:10   Grass, Trees    0.25 mL  Yellow 1:10   Dog, Weeds    0.25 mL  Yellow 1:10   Cat, Molds    0.25 mL    Start Time: 9:00  End Time: 10:45        VITALS   Time BP Pulse pOx Reaction Treatment   9:35 128/85 87 99% none N/a   10:10 154/94 86 99% none N/a   10:45 157/90 85 100% none N/a       ASSESSMENT/PLAN:  Oswaldo Garcia is a 33 year old male here for a follow-up visit.    1. Seasonal allergic rhinitis due to pollen - Phill tolerated today's procedure well without developing any signs or symptoms of an adverse reaction    - return in 7-14 days to continue cluster protocol  - continue pre-medication with cetirizine as directed  - RAPID DESENSITIZATION    2. Allergic rhinitis due to mold    - RAPID DESENSITIZATION    3. Allergic rhinitis due to animals    - RAPID DESENSITIZATION    4. Allergic conjunctivitis, bilateral    - RAPID DESENSITIZATION      Thank you for allowing me to participate in the care of Oswaldo Garcia.      Ewa Greenfield MD, FAAAAI  Allergy/Immunology  [facility]      Chart documentation done in part with Dragon Voice Recognition Software. Although reviewed after completion, some word and grammatical errors may remain.      Again, thank you for allowing me to participate in the care of your patient.        Sincerely,        Ewa Greenfield MD

## 2023-10-31 NOTE — PROGRESS NOTES
Oswaldo Garcia was seen in the Allergy Clinic at [facility].      Oswaldo Garcia is a 33 year old Not  or  male who is seen today for a follow-up visit.    Past Medical History:   Diagnosis Date    Acute appendicitis with peritoneal abscess 02/11/09    D/C 02/13/09    Fracture of ankle, trimalleolar, closed      No family history on file.  Social History     Tobacco Use    Smoking status: Former     Packs/day: .5     Types: Cigarettes     Quit date: 8/5/2013     Years since quitting: 10.2    Smokeless tobacco: Never   Vaping Use    Vaping Use: Never used   Substance Use Topics    Alcohol use: Yes     Comment: occ    Drug use: No     Social History     Social History Narrative    ** Merged History Encounter **            Past medical, family, and social history were reviewed.        Current Outpatient Medications:     cetirizine (ZYRTEC) 10 MG tablet, Take 10 mg by mouth daily, Disp: , Rfl:     EPINEPHrine (ANY BX GENERIC EQUIV) 0.3 MG/0.3ML injection 2-pack, Inject 0.3 mLs (0.3 mg) into the muscle as needed for anaphylaxis May repeat one time in 5-15 minutes if response to initial dose is inadequate., Disp: 2 each, Rfl: 2    ORDER FOR ALLERGEN IMMUNOTHERAPY, Name of Mix: Mix #1  Mold, Cat Cat Hair, Standardized A.P. 10,000 BAU/mL, HS  2.0 ml  Alternaria Tenuis 1:10 w/v, HS  0.5 ml Epicoccum Nigrum 1:10 w/v, HS 0.5 ml Diluent: HSA qs to 5ml, Disp: 5 mL, Rfl: PRN    ORDER FOR ALLERGEN IMMUNOTHERAPY, Name of Mix: Mix #2  Dog, Weeds Dog Hair-Dander, UF  1:650 w/v, HS  1.0 ml  Kochia 1:20 w/v, HS 0.5 ml Nettle 1:20 w/v, HS 0.5 ml Plantain, English 1:20 w/v, HS 0.5 ml Ragweed, Mix (Giant, Short) 1:20 w/v, HS 0.5 ml Russian Thistle 1:20 w/v, HS 0.5 ml Sagebrush, Mugwort 1:20 w/v, HS 0.5 ml Sorrel, Sheep 1:20 w/v, HS 0.5 ml Diluent: HSA qs to 5ml, Disp: 5 mL, Rfl: PRN    ORDER FOR ALLERGEN IMMUNOTHERAPY, Name of Mix: Mix #3  Grass, Tree  Portillo, White 1:20 w/v, HS  0.5 ml Birch Mix PRW  1:20 w/v, HS  0.5 ml Boxelder-Maple Mix BHR (Boxelder Hard Red) 1:20 w/v, HS  0.5 ml [place], Common 1:20 w/v, HS  0.5 ml Elm, American 1:20 w/v, HS  0.5 ml Oak Mix RVW 1:20 w/v, HS 0.5 ml Pine Mix 1:20 w/v, HS 0.5 ml [place] [employer] [employer] Tree, Black 1:20 w/v, HS 0.5 ml Jose Grass 1:20 w/v, HS 0.5 ml Harvey Grass (Std) 100,000 BAU/mL, HS 0.4 ml Diluent: HSA qs to 5ml, Disp: 5 mL, Rfl: PRN  No Known Allergies    EXAM:   There were no vitals taken for this visit.  Physical Exam      WORKUP:  {ALLERGYWORKUP:335487}    ASSESSMENT/PLAN:  Oswaldo Garcia is a 33 year old male ***    ***    Follow-up in ***      Thank you for allowing me to participate in the care of Oswaldo Garcia.      A total of *** minutes, outside of separately billable procedures and injections, was spent on the day of the encounter performing chart review, history and exam, documentation, and counseling and coordination of care as noted above.      Ewa Greenfield MD, FAAAAI  Allergy/Immunology  [facility]      Chart documentation done in part with Dragon Voice Recognition Software. Although reviewed after completion, some word and grammatical errors may remain.

## 2023-10-31 NOTE — PROGRESS NOTES
Oswaldo Garcia was seen in the Allergy Clinic at [facility].      Oswaldo Garcia is a 33 year old Not  or  male who is seen today for cluster immunotherapy. He had no adverse reactions after his last visit. He pre-medicated with cetirizine as directed prior to today's visit.      Past Medical History:   Diagnosis Date    Acute appendicitis with peritoneal abscess 02/11/09    D/C 02/13/09    Fracture of ankle, trimalleolar, closed      No family history on file.  Social History     Tobacco Use    Smoking status: Former     Packs/day: .5     Types: Cigarettes     Quit date: 8/5/2013     Years since quitting: 10.2    Smokeless tobacco: Never   Vaping Use    Vaping Use: Never used   Substance Use Topics    Alcohol use: Yes     Comment: occ    Drug use: No     Social History     Social History Narrative    ** Merged History Encounter **            Past medical, family, and social history were reviewed.        Current Outpatient Medications:     cetirizine (ZYRTEC) 10 MG tablet, Take 10 mg by mouth daily, Disp: , Rfl:     EPINEPHrine (ANY BX GENERIC EQUIV) 0.3 MG/0.3ML injection 2-pack, Inject 0.3 mLs (0.3 mg) into the muscle as needed for anaphylaxis May repeat one time in 5-15 minutes if response to initial dose is inadequate., Disp: 2 each, Rfl: 2    ORDER FOR ALLERGEN IMMUNOTHERAPY, Name of Mix: Mix #1  Mold, Cat Cat Hair, Standardized A.P. 10,000 BAU/mL, HS  2.0 ml  Alternaria Tenuis 1:10 w/v, HS  0.5 ml Epicoccum Nigrum 1:10 w/v, HS 0.5 ml Diluent: HSA qs to 5ml, Disp: 5 mL, Rfl: PRN    ORDER FOR ALLERGEN IMMUNOTHERAPY, Name of Mix: Mix #2  Dog, Weeds Dog Hair-Dander, UF  1:650 w/v, HS  1.0 ml  Kochia 1:20 w/v, HS 0.5 ml Nettle 1:20 w/v, HS 0.5 ml Plantain, English 1:20 w/v, HS 0.5 ml Ragweed, Mix (Giant, Short) 1:20 w/v, HS 0.5 ml Russian Thistle 1:20 w/v, HS 0.5 ml Sagebrush, Mugwort 1:20 w/v, HS 0.5 ml Sorrel, Sheep 1:20 w/v, HS 0.5 ml Diluent: HSA qs to 5ml, Disp: 5 mL,  Rfl: PRN    ORDER FOR ALLERGEN IMMUNOTHERAPY, Name of Mix: Mix #3  Grass, Tree  Portillo, White 1:20 w/v, HS  0.5 ml Birch Mix PRW 1:20 w/v, HS  0.5 ml Boxelder-Maple Mix BHR (Boxelder Hard Red) 1:20 w/v, HS  0.5 ml [place], Common 1:20 w/v, HS  0.5 ml Elm, American 1:20 w/v, HS  0.5 ml Oak Mix RVW 1:20 w/v, HS 0.5 ml Pine Mix 1:20 w/v, HS 0.5 ml [place] Tree, Black 1:20 w/v, HS 0.5 ml Jose Grass 1:20 w/v, HS 0.5 ml Harvey Grass (Std) 100,000 BAU/mL, HS 0.4 ml Diluent: HSA qs to 5ml, Disp: 5 mL, Rfl: PRN  No Known Allergies    EXAM:   There were no vitals taken for this visit.  Physical Exam  Vitals and nursing note reviewed.   Constitutional:       Appearance: Normal appearance.   HENT:      Head: Normocephalic and atraumatic.      Right Ear: External ear normal.      Left Ear: External ear normal.      Nose: No rhinorrhea.      Mouth/Throat:      Mouth: Mucous membranes are moist. No oral lesions.      Pharynx: Oropharynx is clear. Uvula midline. No posterior oropharyngeal erythema.   Eyes:      General: Lids are normal.      Extraocular Movements: Extraocular movements intact.      Conjunctiva/sclera: Conjunctivae normal.   Neck:      Comments: No asymmetry, masses, or scars  Cardiovascular:      Rate and Rhythm: Normal rate and regular rhythm.      Heart sounds: S1 normal and S2 normal. No murmur heard.  Pulmonary:      Effort: Pulmonary effort is normal. No respiratory distress.      Breath sounds: Normal breath sounds and air entry.   Musculoskeletal:      Comments: No musculoskeletal defects appreciated   Skin:     General: Skin is warm and dry.      Findings: No lesion or rash.   Neurological:      General: No focal deficit present.      Mental Status: He is alert.   Psychiatric:         Mood and Affect: Mood and affect normal.           WORKUP:  Cluster Immunotherapy    Cluster Allergen Immunotherapy:    After explaining risks and benefits, and obtaining verbal and written consent, we proceeded with  cluster immunotherapy.     VISIT  VIAL COLOR/STRENGTH  DOSES TO BE GIVEN    1  GREEN (1:1000), BLUE (1:100)  GREEN 0.1, GREEN 0.2, GREEN 0.4, BLUE 0.1    2  BLUE (1:100), YELLOW (1:10)  BLUE 0.2, BLUE 0.4, YELLOW 0.05    3  YELLOW (1:10)  YELLOW 0.1, YELLOW 0.15, YELLOW 0.25    4  YELLOW (1:10)  YELLOW 0.35, YELLOW 0.5    5  RED (1:1)  RED 0.05, RED 0.1    6  RED (1:1)  RED 0.15, RED 0.2    7  RED (1:1)  RED 0.3, RED 0.4    8  RED (1:1)  RED 0.5        VISIT 3    Time Injection Given: 9:00  Yellow 1:10   Grass, Trees    0.1 mL  Yellow 1:10   Dog, Weeds    0.1 mL  Yellow 1:10   Cat, Molds    0.1 mL  Time Injection Given: 9:39  Yellow 1:10   Grass, Trees    0.15 mL  Yellow 1:10   Dog, Weeds    0.15 mL  Yellow 1:10   Cat, Molds    0.15 mL    Time Injection Given: 10:15  Yellow 1:10   Grass, Trees    0.25 mL  Yellow 1:10   Dog, Weeds    0.25 mL  Yellow 1:10   Cat, Molds    0.25 mL    Start Time: 9:00  End Time: 10:45        VITALS   Time BP Pulse pOx Reaction Treatment   9:35 128/85 87 99% none N/a   10:10 154/94 86 99% none N/a   10:45 157/90 85 100% none N/a       ASSESSMENT/PLAN:  Oswaldo Garcia is a 33 year old male here for a follow-up visit.    1. Seasonal allergic rhinitis due to pollen - Phill tolerated today's procedure well without developing any signs or symptoms of an adverse reaction    - return in 7-14 days to continue cluster protocol  - continue pre-medication with cetirizine as directed  - RAPID DESENSITIZATION    2. Allergic rhinitis due to mold    - RAPID DESENSITIZATION    3. Allergic rhinitis due to animals    - RAPID DESENSITIZATION    4. Allergic conjunctivitis, bilateral    - RAPID DESENSITIZATION      Thank you for allowing me to participate in the care of Oswaldo Garcia.      Ewa Greenfield MD, FAAAAI  Allergy/Immunology  [facility]      Chart documentation done in part with Dragon Voice Recognition Software.  Although reviewed after completion, some word and grammatical errors may remain.

## 2023-11-07 ENCOUNTER — OFFICE VISIT (OUTPATIENT)
Dept: ALLERGY | Facility: CLINIC | Age: 33
End: 2023-11-07
Payer: COMMERCIAL

## 2023-11-07 VITALS — SYSTOLIC BLOOD PRESSURE: 143 MMHG | OXYGEN SATURATION: 97 % | DIASTOLIC BLOOD PRESSURE: 85 MMHG | HEART RATE: 85 BPM

## 2023-11-07 DIAGNOSIS — H10.13 ALLERGIC CONJUNCTIVITIS, BILATERAL: ICD-10-CM

## 2023-11-07 DIAGNOSIS — J30.89 ALLERGIC RHINITIS DUE TO MOLD: ICD-10-CM

## 2023-11-07 DIAGNOSIS — J30.1 SEASONAL ALLERGIC RHINITIS DUE TO POLLEN: Primary | ICD-10-CM

## 2023-11-07 DIAGNOSIS — J30.81 ALLERGIC RHINITIS DUE TO ANIMALS: ICD-10-CM

## 2023-11-07 PROCEDURE — 95180 RAPID DESENSITIZATION: CPT | Performed by: ALLERGY & IMMUNOLOGY

## 2023-11-07 NOTE — PROGRESS NOTES
Oswaldo Garcia was seen in the Allergy Clinic at [facility].      Oswaldo Garcia is a 33 year old Not  or  male who is seen today for cluster immunotherapy. He had no adverse reactions after his last visit. He pre-medicated with cetirizine as directed prior to today's visit.      Past Medical History:   Diagnosis Date    Acute appendicitis with peritoneal abscess 02/11/09    D/C 02/13/09    Fracture of ankle, trimalleolar, closed      No family history on file.  Social History     Tobacco Use    Smoking status: Former     Packs/day: .5     Types: Cigarettes     Quit date: 8/5/2013     Years since quitting: 10.2    Smokeless tobacco: Never   Vaping Use    Vaping Use: Never used   Substance Use Topics    Alcohol use: Yes     Comment: occ    Drug use: No     Social History     Social History Narrative    ** Merged History Encounter **            Past medical, family, and social history were reviewed.        Current Outpatient Medications:     cetirizine (ZYRTEC) 10 MG tablet, Take 10 mg by mouth daily, Disp: , Rfl:     EPINEPHrine (ANY BX GENERIC EQUIV) 0.3 MG/0.3ML injection 2-pack, Inject 0.3 mLs (0.3 mg) into the muscle as needed for anaphylaxis May repeat one time in 5-15 minutes if response to initial dose is inadequate., Disp: 2 each, Rfl: 2    ORDER FOR ALLERGEN IMMUNOTHERAPY, Name of Mix: Mix #1  Mold, Cat Cat Hair, Standardized A.P. 10,000 BAU/mL, HS  2.0 ml  Alternaria Tenuis 1:10 w/v, HS  0.5 ml Epicoccum Nigrum 1:10 w/v, HS 0.5 ml Diluent: HSA qs to 5ml, Disp: 5 mL, Rfl: PRN    ORDER FOR ALLERGEN IMMUNOTHERAPY, Name of Mix: Mix #2  Dog, Weeds Dog Hair-Dander, UF  1:650 w/v, HS  1.0 ml  Kochia 1:20 w/v, HS 0.5 ml Nettle 1:20 w/v, HS 0.5 ml Plantain, English 1:20 w/v, HS 0.5 ml Ragweed, Mix (Giant, Short) 1:20 w/v, HS 0.5 ml Russian Thistle 1:20 w/v, HS 0.5 ml Sagebrush, Mugwort 1:20 w/v, HS 0.5 ml Sorrel, Sheep 1:20 w/v, HS 0.5 ml Diluent: HSA qs to 5ml, Disp: 5 mL,  Rfl: PRN    ORDER FOR ALLERGEN IMMUNOTHERAPY, Name of Mix: Mix #3  Grass, Tree  Portillo, White 1:20 w/v, HS  0.5 ml Birch Mix PRW 1:20 w/v, HS  0.5 ml Boxelder-Maple Mix BHR (Boxelder Hard Red) 1:20 w/v, HS  0.5 ml Garza, Common 1:20 w/v, HS  0.5 ml Elm, American 1:20 w/v, HS  0.5 ml Oak Mix RVW 1:20 w/v, HS 0.5 ml Pine Mix 1:20 w/v, HS 0.5 ml [place] Tree, Black 1:20 w/v, HS 0.5 ml Jose Grass 1:20 w/v, HS 0.5 ml Harvey Grass (Std) 100,000 BAU/mL, HS 0.4 ml Diluent: HSA qs to 5ml, Disp: 5 mL, Rfl: PRN  No Known Allergies    EXAM:   BP (!) 143/85   Pulse 85   SpO2 97%   Physical Exam  Vitals and nursing note reviewed.   Constitutional:       Appearance: Normal appearance.   HENT:      Head: Normocephalic and atraumatic.      Right Ear: External ear normal.      Left Ear: External ear normal.      Nose: No rhinorrhea.      Mouth/Throat:      Mouth: Mucous membranes are moist. No oral lesions.      Pharynx: Oropharynx is clear. Uvula midline. No posterior oropharyngeal erythema.   Eyes:      General: Lids are normal.      Extraocular Movements: Extraocular movements intact.      Conjunctiva/sclera: Conjunctivae normal.   Neck:      Comments: No asymmetry, masses, or scars  Cardiovascular:      Rate and Rhythm: Normal rate and regular rhythm.      Heart sounds: S1 normal and S2 normal. No murmur heard.  Pulmonary:      Effort: Pulmonary effort is normal. No respiratory distress.      Breath sounds: Normal breath sounds and air entry.   Musculoskeletal:      Comments: No musculoskeletal defects appreciated   Skin:     General: Skin is warm and dry.      Findings: No lesion or rash.   Neurological:      General: No focal deficit present.      Mental Status: He is alert.   Psychiatric:         Mood and Affect: Mood and affect normal.           WORKUP:  Cluster Immunotherapy    Cluster Allergen Immunotherapy:    After explaining risks and benefits, and obtaining verbal and written consent, we proceeded with cluster  immunotherapy.     VISIT  VIAL COLOR/STRENGTH  DOSES TO BE GIVEN    1  GREEN (1:1000), BLUE (1:100)  GREEN 0.1, GREEN 0.2, GREEN 0.4, BLUE 0.1    2  BLUE (1:100), YELLOW (1:10)  BLUE 0.2, BLUE 0.4, YELLOW 0.05    3  YELLOW (1:10)  YELLOW 0.1, YELLOW 0.15, YELLOW 0.25    4  YELLOW (1:10)  YELLOW 0.35, YELLOW 0.5    5  RED (1:1)  RED 0.05, RED 0.1    6  RED (1:1)  RED 0.15, RED 0.2    7  RED (1:1)  RED 0.3, RED 0.4    8  RED (1:1)  RED 0.5        VISIT 4    Time Injection Given: 7:15  Yellow 1:10   Grass, Trees    0.35 mL  Yellow 1:10   Dog, Weeds    0.35 mL  Yellow 1:10   Cat, Molds    0.35 mL    Time Injection Given: 7:55  Yellow 1:10   Grass, Trees    0.5 mL  Yellow 1:10   Dog, Weeds    0.5 mL  Yellow 1:10   Cat, Molds    0.5 mL      Start Time: 7:15  End Time: 8:25      VITALS   Time BP Pulse pOx Reaction Treatment   7:50 136/93 86 98% none N/a   8:25 136/88 89 100% none N/a       ASSESSMENT/PLAN:  Oswaldo Garcia is a 33 year old male here for cluster immunotherapy.    1. Seasonal allergic rhinitis due to pollen - Phill tolerated today's procedure well without developing any signs or symptoms of an adverse reaction.    - return in 7-14 days to continue cluster protocol  - continue pre-medication with cetirizine as directed  - RAPID DESENSITIZATION    2. Allergic rhinitis due to animals    - RAPID DESENSITIZATION    3. Allergic rhinitis due to mold    - RAPID DESENSITIZATION    4. Allergic conjunctivitis, bilateral    - RAPID DESENSITIZATION      Thank you for allowing me to participate in the care of Oswaldo Garcia.      Ewa Greenfield MD, FAAAAI  Allergy/Immunology  [facility]      Chart documentation done in part with Dragon Voice Recognition Software. Although reviewed after completion, some word and grammatical errors may remain.

## 2023-11-07 NOTE — LETTER
11/7/2023         RE: Oswaldo Garcia  [address] [facility] [address]        Dear Colleague,    Thank you for referring your patient, Oswaldo Garcia, to the [facility]. Please see a copy of my visit note below.    Oswaldo Garcia was seen in the Allergy Clinic at [facility].      Oswaldo Garcia is a 33 year old Not  or  male who is seen today for cluster immunotherapy. He had no adverse reactions after his last visit. He pre-medicated with cetirizine as directed prior to today's visit.      Past Medical History:   Diagnosis Date     Acute appendicitis with peritoneal abscess 02/11/09    D/C 02/13/09     Fracture of ankle, trimalleolar, closed      No family history on file.  Social History     Tobacco Use     Smoking status: Former     Packs/day: .5     Types: Cigarettes     Quit date: 8/5/2013     Years since quitting: 10.2     Smokeless tobacco: Never   Vaping Use     Vaping Use: Never used   Substance Use Topics     Alcohol use: Yes     Comment: occ     Drug use: No     Social History     Social History Narrative    ** Merged History Encounter **            Past medical, family, and social history were reviewed.        Current Outpatient Medications:      cetirizine (ZYRTEC) 10 MG tablet, Take 10 mg by mouth daily, Disp: , Rfl:      EPINEPHrine (ANY BX GENERIC EQUIV) 0.3 MG/0.3ML injection 2-pack, Inject 0.3 mLs (0.3 mg) into the muscle as needed for anaphylaxis May repeat one time in 5-15 minutes if response to initial dose is inadequate., Disp: 2 each, Rfl: 2     ORDER FOR ALLERGEN IMMUNOTHERAPY, Name of Mix: Mix #1  Mold, Cat Cat Hair, Standardized A.P. 10,000 BAU/mL, HS  2.0 ml  Alternaria Tenuis 1:10 w/v, HS  0.5 ml Epicoccum Nigrum 1:10 w/v, HS 0.5 ml Diluent: HSA qs to 5ml, Disp: 5 mL, Rfl: PRN     ORDER FOR ALLERGEN IMMUNOTHERAPY, Name of Mix: Mix #2  Dog, Weeds Dog Hair-Dander, UF  1:650 w/v, HS  1.0 ml  Kochia 1:20 w/v, HS  0.5 ml Nettle 1:20 w/v, HS 0.5 ml Plantain, English 1:20 w/v, HS 0.5 ml Ragweed, Mix (Giant, Short) 1:20 w/v, HS 0.5 ml Russian Thistle 1:20 w/v, HS 0.5 ml Sagebrush, Mugwort 1:20 w/v, HS 0.5 ml Sorrel, Sheep 1:20 w/v, HS 0.5 ml Diluent: HSA qs to 5ml, Disp: 5 mL, Rfl: PRN     ORDER FOR ALLERGEN IMMUNOTHERAPY, Name of Mix: Mix #3  Grass, Tree  Portillo, White 1:20 w/v, HS  0.5 ml Birch Mix PRW 1:20 w/v, HS  0.5 ml Boxelder-Maple Mix BHR (Boxelder Hard Red) 1:20 w/v, HS  0.5 ml [place], Common 1:20 w/v, HS  0.5 ml Elm, American 1:20 w/v, HS  0.5 ml Oak Mix RVW 1:20 w/v, HS 0.5 ml Pine Mix 1:20 w/v, HS 0.5 ml [place] Tree, Black 1:20 w/v, HS 0.5 ml Jose Grass 1:20 w/v, HS 0.5 ml Harvey Grass (Std) 100,000 BAU/mL, HS 0.4 ml Diluent: HSA qs to 5ml, Disp: 5 mL, Rfl: PRN  No Known Allergies    EXAM:   BP (!) 143/85   Pulse 85   SpO2 97%   Physical Exam  Vitals and nursing note reviewed.   Constitutional:       Appearance: Normal appearance.   HENT:      Head: Normocephalic and atraumatic.      Right Ear: External ear normal.      Left Ear: External ear normal.      Nose: No rhinorrhea.      Mouth/Throat:      Mouth: Mucous membranes are moist. No oral lesions.      Pharynx: Oropharynx is clear. Uvula midline. No posterior oropharyngeal erythema.   Eyes:      General: Lids are normal.      Extraocular Movements: Extraocular movements intact.      Conjunctiva/sclera: Conjunctivae normal.   Neck:      Comments: No asymmetry, masses, or scars  Cardiovascular:      Rate and Rhythm: Normal rate and regular rhythm.      Heart sounds: S1 normal and S2 normal. No murmur heard.  Pulmonary:      Effort: Pulmonary effort is normal. No respiratory distress.      Breath sounds: Normal breath sounds and air entry.   Musculoskeletal:      Comments: No musculoskeletal defects appreciated   Skin:     General: Skin is warm and dry.      Findings: No lesion or rash.   Neurological:      General: No focal deficit present.      Mental  Status: He is alert.   Psychiatric:         Mood and Affect: Mood and affect normal.           WORKUP:  Cluster Immunotherapy    Cluster Allergen Immunotherapy:    After explaining risks and benefits, and obtaining verbal and written consent, we proceeded with cluster immunotherapy.     VISIT  VIAL COLOR/STRENGTH  DOSES TO BE GIVEN    1  GREEN (1:1000), BLUE (1:100)  GREEN 0.1, GREEN 0.2, GREEN 0.4, BLUE 0.1    2  BLUE (1:100), YELLOW (1:10)  BLUE 0.2, BLUE 0.4, YELLOW 0.05    3  YELLOW (1:10)  YELLOW 0.1, YELLOW 0.15, YELLOW 0.25    4  YELLOW (1:10)  YELLOW 0.35, YELLOW 0.5    5  RED (1:1)  RED 0.05, RED 0.1    6  RED (1:1)  RED 0.15, RED 0.2    7  RED (1:1)  RED 0.3, RED 0.4    8  RED (1:1)  RED 0.5        VISIT 4    Time Injection Given: 7:15  Yellow 1:10   Grass, Trees    0.35 mL  Yellow 1:10   Dog, Weeds    0.35 mL  Yellow 1:10   Cat, Molds    0.35 mL    Time Injection Given: 7:55  Yellow 1:10   Grass, Trees    0.5 mL  Yellow 1:10   Dog, Weeds    0.5 mL  Yellow 1:10   Cat, Molds    0.5 mL      Start Time: 7:15  End Time: 8:25      VITALS   Time BP Pulse pOx Reaction Treatment   7:50 136/93 86 98% none N/a   8:25 136/88 89 100% none N/a       ASSESSMENT/PLAN:  Oswaldo Garcia is a 33 year old male here for cluster immunotherapy.    1. Seasonal allergic rhinitis due to pollen - Phill tolerated today's procedure well without developing any signs or symptoms of an adverse reaction.    - return in 7-14 days to continue cluster protocol  - continue pre-medication with cetirizine as directed  - RAPID DESENSITIZATION    2. Allergic rhinitis due to animals    - RAPID DESENSITIZATION    3. Allergic rhinitis due to mold    - RAPID DESENSITIZATION    4. Allergic conjunctivitis, bilateral    - RAPID DESENSITIZATION      Thank you for allowing me to participate in the care of Oswaldo Garcia.      Ewa Greenfield MD, FAAAAI  Allergy/Immunology  [facility]      Chart documentation done in part with Dragon Voice Recognition Software. Although reviewed after completion, some word and grammatical errors may remain.    Prior to initiation of cluster immunotherapy RN ensured that patient was feeling healthy, has premedicated with Zyrtec or Allegra yesterday twice daily and this morning. RN also ensured that patient has unexpired Epi-Pen, had no new medication changes, and did not have a reaction after the last allergy shots were given. If the patient has asthma it is well-controlled. The patient has not been ill in the past 7 days. Patient was given allergy injections per cluster immunotherapy protocol 30 minutes apart and vital signs were monitored. Patient was monitored in clinic for 30 minutes after last injection was given and assessed by provider before discharging.     Deidre Cardenas, ROMEO      Again, thank you for allowing me to participate in the care of your patient.        Sincerely,        Ewa Greenfield MD

## 2023-11-14 ENCOUNTER — OFFICE VISIT (OUTPATIENT)
Dept: ALLERGY | Facility: CLINIC | Age: 33
End: 2023-11-14
Payer: COMMERCIAL

## 2023-11-14 VITALS — DIASTOLIC BLOOD PRESSURE: 73 MMHG | HEART RATE: 71 BPM | SYSTOLIC BLOOD PRESSURE: 133 MMHG | OXYGEN SATURATION: 97 %

## 2023-11-14 DIAGNOSIS — J30.1 SEASONAL ALLERGIC RHINITIS DUE TO POLLEN: Primary | ICD-10-CM

## 2023-11-14 DIAGNOSIS — H10.13 ALLERGIC CONJUNCTIVITIS, BILATERAL: ICD-10-CM

## 2023-11-14 DIAGNOSIS — J30.89 ALLERGIC RHINITIS DUE TO MOLD: ICD-10-CM

## 2023-11-14 DIAGNOSIS — J30.81 ALLERGIC RHINITIS DUE TO ANIMALS: ICD-10-CM

## 2023-11-14 PROCEDURE — 95180 RAPID DESENSITIZATION: CPT | Performed by: ALLERGY & IMMUNOLOGY

## 2023-11-14 NOTE — LETTER
11/14/2023         RE: Oswaldo Garcia  [address] [facility] [address]        Dear Colleague,    Thank you for referring your patient, Oswaldo Garcia, to the [facility]. Please see a copy of my visit note below.    Oswaldo Garcia was seen in the Allergy Clinic at [facility].      Oswaldo Garcia is a 33 year old Not  or  male who is seen today for cluster immunotherapy. He had no adverse reactions after his last visit. He pre-medicated with cetirizine as directed prior to today's visit.      Past Medical History:   Diagnosis Date     Acute appendicitis with peritoneal abscess 02/11/09    D/C 02/13/09     Fracture of ankle, trimalleolar, closed      History reviewed. No pertinent family history.  Social History     Tobacco Use     Smoking status: Former     Packs/day: .5     Types: Cigarettes     Quit date: 8/5/2013     Years since quitting: 10.2     Smokeless tobacco: Never   Vaping Use     Vaping Use: Never used   Substance Use Topics     Alcohol use: Yes     Comment: occ     Drug use: No     Social History     Social History Narrative    ** Merged History Encounter **            Past medical, family, and social history were reviewed.        Current Outpatient Medications:      cetirizine (ZYRTEC) 10 MG tablet, Take 10 mg by mouth daily, Disp: , Rfl:      EPINEPHrine (ANY BX GENERIC EQUIV) 0.3 MG/0.3ML injection 2-pack, Inject 0.3 mLs (0.3 mg) into the muscle as needed for anaphylaxis May repeat one time in 5-15 minutes if response to initial dose is inadequate., Disp: 2 each, Rfl: 2     ORDER FOR ALLERGEN IMMUNOTHERAPY, Name of Mix: Mix #1  Mold, Cat Cat Hair, Standardized A.P. 10,000 BAU/mL, HS  2.0 ml  Alternaria Tenuis 1:10 w/v, HS  0.5 ml Epicoccum Nigrum 1:10 w/v, HS 0.5 ml Diluent: HSA qs to 5ml, Disp: 5 mL, Rfl: PRN     ORDER FOR ALLERGEN IMMUNOTHERAPY, Name of Mix: Mix #2  Dog, Weeds Dog Hair-Dander, UF  1:650 w/v, HS  1.0 ml   Kochia 1:20 w/v, HS 0.5 ml Nettle 1:20 w/v, HS 0.5 ml Plantain, English 1:20 w/v, HS 0.5 ml Ragweed, Mix (Giant, Short) 1:20 w/v, HS 0.5 ml Russian Thistle 1:20 w/v, HS 0.5 ml Sagebrush, Mugwort 1:20 w/v, HS 0.5 ml Sorrel, Sheep 1:20 w/v, HS 0.5 ml Diluent: HSA qs to 5ml, Disp: 5 mL, Rfl: PRN     ORDER FOR ALLERGEN IMMUNOTHERAPY, Name of Mix: Mix #3  Grass, Tree  Portillo, White 1:20 w/v, HS  0.5 ml Birch Mix PRW 1:20 w/v, HS  0.5 ml Boxelder-Maple Mix BHR (Boxelder Hard Red) 1:20 w/v, HS  0.5 ml [place], Common 1:20 w/v, HS  0.5 ml Elm, American 1:20 w/v, HS  0.5 ml Oak Mix RVW 1:20 w/v, HS 0.5 ml Pine Mix 1:20 w/v, HS 0.5 ml [place] Tree, Black 1:20 w/v, HS 0.5 ml Jose Grass 1:20 w/v, HS 0.5 ml Harvey Grass (Std) 100,000 BAU/mL, HS 0.4 ml Diluent: HSA qs to 5ml, Disp: 5 mL, Rfl: PRN  No Known Allergies    EXAM:   /73   Pulse 71   SpO2 97%   Physical Exam  Vitals and nursing note reviewed.   Constitutional:       Appearance: Normal appearance.   HENT:      Head: Normocephalic and atraumatic.      Right Ear: External ear normal.      Left Ear: External ear normal.      Nose: No rhinorrhea.      Mouth/Throat:      Mouth: Mucous membranes are moist. No oral lesions.      Pharynx: Oropharynx is clear. Uvula midline. No posterior oropharyngeal erythema.   Eyes:      General: Lids are normal.      Extraocular Movements: Extraocular movements intact.      Conjunctiva/sclera: Conjunctivae normal.   Neck:      Comments: No asymmetry, masses, or scars  Cardiovascular:      Rate and Rhythm: Normal rate and regular rhythm.      Heart sounds: S1 normal and S2 normal. No murmur heard.  Pulmonary:      Effort: Pulmonary effort is normal. No respiratory distress.      Breath sounds: Normal breath sounds and air entry.   Musculoskeletal:      Comments: No musculoskeletal defects appreciated   Skin:     General: Skin is warm and dry.      Findings: No lesion or rash.   Neurological:      General: No focal deficit present.       Mental Status: He is alert.   Psychiatric:         Mood and Affect: Mood and affect normal.           WORKUP:  Cluster Immunotherapy  Cluster Allergen Immunotherapy:    After explaining risks and benefits, and obtaining verbal and written consent, we proceeded with cluster immunotherapy.     VISIT  VIAL COLOR/STRENGTH  DOSES TO BE GIVEN    1  GREEN (1:1000), BLUE (1:100)  GREEN 0.1, GREEN 0.2, GREEN 0.4, BLUE 0.1    2  BLUE (1:100), YELLOW (1:10)  BLUE 0.2, BLUE 0.4, YELLOW 0.05    3  YELLOW (1:10)  YELLOW 0.1, YELLOW 0.15, YELLOW 0.25    4  YELLOW (1:10)  YELLOW 0.35, YELLOW 0.5    5  RED (1:1)  RED 0.05, RED 0.1    6  RED (1:1)  RED 0.15, RED 0.2    7  RED (1:1)  RED 0.3, RED 0.4    8  RED (1:1)  RED 0.5        VISIT 5    Time Injection Given: 7:14  Red 1:1   Grass, Trees    0.05 mL  Red 1:1   Dog, Weeds    0.05 mL  Red 1:1   Cat, Molds    0.05 mL      Time Injection Given: 7:50  Red 1:1   Grass, Trees    0.1 mL  Red 1:1   Dog, Weeds    0.1 mL  Red 1:1   Cat, Molds    0.1 mL      Start Time: 7:14  End Time: 8:25      VITALS   Time BP Pulse pOx Reaction Treatment   7:45 133/73 71 98% none N/A   8:25 135/80 70 99% none N/A       ASSESSMENT/PLAN:  Oswaldo Garcia is a 33 year old male here for cluster immunotherapy.    1. Seasonal allergic rhinitis due to pollen - Phill tolerated today's procedure well without developing any signs or symptoms of an adverse reaction.    - return in 7-14 days to continue cluster protocol  - continue pre-medication with cetirizine as directed  - RAPID DESENSITIZATION    2. Allergic rhinitis due to animals    - RAPID DESENSITIZATION    3. Allergic rhinitis due to mold    - RAPID DESENSITIZATION    4. Allergic conjunctivitis, bilateral    - RAPID DESENSITIZATION      Thank you for allowing me to participate in the care of Oswaldo Garcia.      Ewa Greenfield MD, FAAAAI  Allergy/Immunology  [facility]      Chart documentation done in part with Dragon Voice Recognition Software. Although reviewed after completion, some word and grammatical errors may remain.    Prior to initiation of cluster immunotherapy RN ensured that patient was feeling healthy, has premedicated with Zyrtec or Allegra yesterday twice daily and this morning. RN also ensured that patient has unexpired Epi-Pen, had no new medication changes, and did not have a reaction after the last allergy shots were given. If the patient has asthma it is well-controlled. The patient has not been ill in the past 7 days. Patient was given allergy injections per cluster immunotherapy protocol 30 minutes apart and vital signs were monitored. Patient was monitored in clinic for 30 minutes after last injection was given and assessed by provider before discharging.     Deidre Cardenas, ROMEO      Again, thank you for allowing me to participate in the care of your patient.        Sincerely,        Ewa Greenfield MD

## 2023-11-14 NOTE — PROGRESS NOTES
Oswaldo Garcia was seen in the Allergy Clinic at [facility].      Oswaldo Garcia is a 33 year old Not  or  male who is seen today for cluster immunotherapy. He had no adverse reactions after his last visit. He pre-medicated with cetirizine as directed prior to today's visit.      Past Medical History:   Diagnosis Date    Acute appendicitis with peritoneal abscess 02/11/09    D/C 02/13/09    Fracture of ankle, trimalleolar, closed      History reviewed. No pertinent family history.  Social History     Tobacco Use    Smoking status: Former     Packs/day: .5     Types: Cigarettes     Quit date: 8/5/2013     Years since quitting: 10.2    Smokeless tobacco: Never   Vaping Use    Vaping Use: Never used   Substance Use Topics    Alcohol use: Yes     Comment: occ    Drug use: No     Social History     Social History Narrative    ** Merged History Encounter **            Past medical, family, and social history were reviewed.        Current Outpatient Medications:     cetirizine (ZYRTEC) 10 MG tablet, Take 10 mg by mouth daily, Disp: , Rfl:     EPINEPHrine (ANY BX GENERIC EQUIV) 0.3 MG/0.3ML injection 2-pack, Inject 0.3 mLs (0.3 mg) into the muscle as needed for anaphylaxis May repeat one time in 5-15 minutes if response to initial dose is inadequate., Disp: 2 each, Rfl: 2    ORDER FOR ALLERGEN IMMUNOTHERAPY, Name of Mix: Mix #1  Mold, Cat Cat Hair, Standardized A.P. 10,000 BAU/mL, HS  2.0 ml  Alternaria Tenuis 1:10 w/v, HS  0.5 ml Epicoccum Nigrum 1:10 w/v, HS 0.5 ml Diluent: HSA qs to 5ml, Disp: 5 mL, Rfl: PRN    ORDER FOR ALLERGEN IMMUNOTHERAPY, Name of Mix: Mix #2  Dog, Weeds Dog Hair-Dander, UF  1:650 w/v, HS  1.0 ml  Kochia 1:20 w/v, HS 0.5 ml Nettle 1:20 w/v, HS 0.5 ml Plantain, English 1:20 w/v, HS 0.5 ml Ragweed, Mix (Giant, Short) 1:20 w/v, HS 0.5 ml Russian Thistle 1:20 w/v, HS 0.5 ml Sagebrush, Mugwort 1:20 w/v, HS 0.5 ml Sorrel, Sheep 1:20 w/v, HS 0.5 ml Diluent: HSA qs  to 5ml, Disp: 5 mL, Rfl: PRN    ORDER FOR ALLERGEN IMMUNOTHERAPY, Name of Mix: Mix #3  Grass, Tree  Portillo, White 1:20 w/v, HS  0.5 ml Birch Mix PRW 1:20 w/v, HS  0.5 ml Boxelder-Maple Mix BHR (Boxelder Hard Red) 1:20 w/v, HS  0.5 ml [place], Common 1:20 w/v, HS  0.5 ml Elm, American 1:20 w/v, HS  0.5 ml Oak Mix RVW 1:20 w/v, HS 0.5 ml Pine Mix 1:20 w/v, HS 0.5 ml [place] Tree, Black 1:20 w/v, HS 0.5 ml Jose Grass 1:20 w/v, HS 0.5 ml Harvey Grass (Std) 100,000 BAU/mL, HS 0.4 ml Diluent: HSA qs to 5ml, Disp: 5 mL, Rfl: PRN  No Known Allergies    EXAM:   /73   Pulse 71   SpO2 97%   Physical Exam  Vitals and nursing note reviewed.   Constitutional:       Appearance: Normal appearance.   HENT:      Head: Normocephalic and atraumatic.      Right Ear: External ear normal.      Left Ear: External ear normal.      Nose: No rhinorrhea.      Mouth/Throat:      Mouth: Mucous membranes are moist. No oral lesions.      Pharynx: Oropharynx is clear. Uvula midline. No posterior oropharyngeal erythema.   Eyes:      General: Lids are normal.      Extraocular Movements: Extraocular movements intact.      Conjunctiva/sclera: Conjunctivae normal.   Neck:      Comments: No asymmetry, masses, or scars  Cardiovascular:      Rate and Rhythm: Normal rate and regular rhythm.      Heart sounds: S1 normal and S2 normal. No murmur heard.  Pulmonary:      Effort: Pulmonary effort is normal. No respiratory distress.      Breath sounds: Normal breath sounds and air entry.   Musculoskeletal:      Comments: No musculoskeletal defects appreciated   Skin:     General: Skin is warm and dry.      Findings: No lesion or rash.   Neurological:      General: No focal deficit present.      Mental Status: He is alert.   Psychiatric:         Mood and Affect: Mood and affect normal.           WORKUP:  Cluster Immunotherapy  Cluster Allergen Immunotherapy:    After explaining risks and benefits, and obtaining verbal and written consent, we proceeded  with cluster immunotherapy.     VISIT  VIAL COLOR/STRENGTH  DOSES TO BE GIVEN    1  GREEN (1:1000), BLUE (1:100)  GREEN 0.1, GREEN 0.2, GREEN 0.4, BLUE 0.1    2  BLUE (1:100), YELLOW (1:10)  BLUE 0.2, BLUE 0.4, YELLOW 0.05    3  YELLOW (1:10)  YELLOW 0.1, YELLOW 0.15, YELLOW 0.25    4  YELLOW (1:10)  YELLOW 0.35, YELLOW 0.5    5  RED (1:1)  RED 0.05, RED 0.1    6  RED (1:1)  RED 0.15, RED 0.2    7  RED (1:1)  RED 0.3, RED 0.4    8  RED (1:1)  RED 0.5        VISIT 5    Time Injection Given: 7:14  Red 1:1   Grass, Trees    0.05 mL  Red 1:1   Dog, Weeds    0.05 mL  Red 1:1   Cat, Molds    0.05 mL      Time Injection Given: 7:50  Red 1:1   Grass, Trees    0.1 mL  Red 1:1   Dog, Weeds    0.1 mL  Red 1:1   Cat, Molds    0.1 mL      Start Time: 7:14  End Time: 8:25      VITALS   Time BP Pulse pOx Reaction Treatment   7:45 133/73 71 98% none N/A   8:25 135/80 70 99% none N/A       ASSESSMENT/PLAN:  Oswaldo Garcia is a 33 year old male here for cluster immunotherapy.    1. Seasonal allergic rhinitis due to pollen - Phill tolerated today's procedure well without developing any signs or symptoms of an adverse reaction.    - return in 7-14 days to continue cluster protocol  - continue pre-medication with cetirizine as directed  - RAPID DESENSITIZATION    2. Allergic rhinitis due to animals    - RAPID DESENSITIZATION    3. Allergic rhinitis due to mold    - RAPID DESENSITIZATION    4. Allergic conjunctivitis, bilateral    - RAPID DESENSITIZATION      Thank you for allowing me to participate in the care of Oswaldo Garcia.      Ewa Greenfield MD, FAAAAI  Allergy/Immunology  [facility]      Chart documentation done in part with Dragon Voice Recognition Software. Although reviewed after completion, some word and grammatical errors may remain.

## 2023-11-28 ENCOUNTER — OFFICE VISIT (OUTPATIENT)
Dept: ALLERGY | Facility: CLINIC | Age: 33
End: 2023-11-28
Payer: COMMERCIAL

## 2023-11-28 VITALS — HEART RATE: 92 BPM | SYSTOLIC BLOOD PRESSURE: 135 MMHG | DIASTOLIC BLOOD PRESSURE: 89 MMHG | OXYGEN SATURATION: 98 %

## 2023-11-28 DIAGNOSIS — J30.1 SEASONAL ALLERGIC RHINITIS DUE TO POLLEN: Primary | ICD-10-CM

## 2023-11-28 DIAGNOSIS — J30.89 ALLERGIC RHINITIS DUE TO MOLD: ICD-10-CM

## 2023-11-28 DIAGNOSIS — J30.81 ALLERGIC RHINITIS DUE TO ANIMALS: ICD-10-CM

## 2023-11-28 DIAGNOSIS — H10.13 ALLERGIC CONJUNCTIVITIS, BILATERAL: ICD-10-CM

## 2023-11-28 PROCEDURE — 95180 RAPID DESENSITIZATION: CPT | Performed by: ALLERGY & IMMUNOLOGY

## 2023-11-28 NOTE — LETTER
11/28/2023         RE: Oswaldo Garcia  [address] [facility] [address]        Dear Colleague,    Thank you for referring your patient, Oswaldo Garcia, to the [facility]. Please see a copy of my visit note below.    Oswaldo Garcia was seen in the Allergy Clinic at [facility].      Oswaldo Garcia is a 33 year old Not  or  male who is seen today for cluster immunotherapy. He had no adverse reactions after his last visit. He pre-medicated with cetirizine as directed prior to today's visit.     Past Medical History:   Diagnosis Date     Acute appendicitis with peritoneal abscess 02/11/09    D/C 02/13/09     Fracture of ankle, trimalleolar, closed      No family history on file.  Social History     Tobacco Use     Smoking status: Former     Packs/day: .5     Types: Cigarettes     Quit date: 8/5/2013     Years since quitting: 10.3     Smokeless tobacco: Never   Vaping Use     Vaping Use: Never used   Substance Use Topics     Alcohol use: Yes     Comment: occ     Drug use: No     Social History     Social History Narrative    ** Merged History Encounter **            Past medical, family, and social history were reviewed.        Current Outpatient Medications:      cetirizine (ZYRTEC) 10 MG tablet, Take 10 mg by mouth daily, Disp: , Rfl:      EPINEPHrine (ANY BX GENERIC EQUIV) 0.3 MG/0.3ML injection 2-pack, Inject 0.3 mLs (0.3 mg) into the muscle as needed for anaphylaxis May repeat one time in 5-15 minutes if response to initial dose is inadequate., Disp: 2 each, Rfl: 2     ORDER FOR ALLERGEN IMMUNOTHERAPY, Name of Mix: Mix #1  Mold, Cat Cat Hair, Standardized A.P. 10,000 BAU/mL, HS  2.0 ml  Alternaria Tenuis 1:10 w/v, HS  0.5 ml Epicoccum Nigrum 1:10 w/v, HS 0.5 ml Diluent: HSA qs to 5ml, Disp: 5 mL, Rfl: PRN     ORDER FOR ALLERGEN IMMUNOTHERAPY, Name of Mix: Mix #2  Dog, Weeds Dog Hair-Dander, UF  1:650 w/v, HS  1.0 ml  Kochia 1:20 w/v, HS  0.5 ml Nettle 1:20 w/v, HS 0.5 ml Plantain, English 1:20 w/v, HS 0.5 ml Ragweed, Mix (Giant, Short) 1:20 w/v, HS 0.5 ml Russian Thistle 1:20 w/v, HS 0.5 ml Sagebrush, Mugwort 1:20 w/v, HS 0.5 ml Sorrel, Sheep 1:20 w/v, HS 0.5 ml Diluent: HSA qs to 5ml, Disp: 5 mL, Rfl: PRN     ORDER FOR ALLERGEN IMMUNOTHERAPY, Name of Mix: Mix #3  Grass, Tree  [place]illo, White 1:20 w/v, HS  0.5 ml Birch Mix PRW 1:20 w/v, HS  0.5 ml Boxelder-Maple Mix BHR (Boxelder Hard Red) 1:20 w/v, HS  0.5 ml [place], Common 1:20 w/v, HS  0.5 ml Elm, American 1:20 w/v, HS  0.5 ml Oak Mix RVW 1:20 w/v, HS 0.5 ml Pine Mix 1:20 w/v, HS 0.5 ml [place] Tree, Black 1:20 w/v, HS 0.5 ml Jose Grass 1:20 w/v, HS 0.5 ml Harvey Grass (Std) 100,000 BAU/mL, HS 0.4 ml Diluent: HSA qs to 5ml, Disp: 5 mL, Rfl: PRN  No Known Allergies    EXAM:   /89 (BP Location: Right arm, Patient Position: Sitting, Cuff Size: Adult Large)   Pulse 92   SpO2 98%   Physical Exam  Vitals and nursing note reviewed.   Constitutional:       Appearance: Normal appearance.   HENT:      Head: Normocephalic and atraumatic.      Right Ear: External ear normal.      Left Ear: External ear normal.      Nose: No rhinorrhea.      Mouth/Throat:      Mouth: Mucous membranes are moist. No oral lesions.      Pharynx: Oropharynx is clear. Uvula midline. No posterior oropharyngeal erythema.   Eyes:      General: Lids are normal.      Extraocular Movements: Extraocular movements intact.      Conjunctiva/sclera: Conjunctivae normal.   Neck:      Comments: No asymmetry, masses, or scars  Cardiovascular:      Rate and Rhythm: Normal rate and regular rhythm.      Heart sounds: S1 normal and S2 normal. No murmur heard.  Pulmonary:      Effort: Pulmonary effort is normal. No respiratory distress.      Breath sounds: Normal breath sounds and air entry.   Musculoskeletal:      Comments: No musculoskeletal defects appreciated   Skin:     General: Skin is warm and dry.      Findings: No lesion or rash.    Neurological:      General: No focal deficit present.      Mental Status: He is alert.   Psychiatric:         Mood and Affect: Mood and affect normal.           WORKUP:  Cluster Immunotherapy    Cluster Allergen Immunotherapy:    After explaining risks and benefits, and obtaining verbal and written consent, we proceeded with cluster immunotherapy.     VISIT  VIAL COLOR/STRENGTH  DOSES TO BE GIVEN    1  GREEN (1:1000), BLUE (1:100)  GREEN 0.1, GREEN 0.2, GREEN 0.4, BLUE 0.1    2  BLUE (1:100), YELLOW (1:10)  BLUE 0.2, BLUE 0.4, YELLOW 0.05    3  YELLOW (1:10)  YELLOW 0.1, YELLOW 0.15, YELLOW 0.25    4  YELLOW (1:10)  YELLOW 0.35, YELLOW 0.5    5  RED (1:1)  RED 0.05, RED 0.1    6  RED (1:1)  RED 0.15, RED 0.2    7  RED (1:1)  RED 0.3, RED 0.4    8  RED (1:1)  RED 0.5        VISIT 6    Time Injection Given: 7:21  Red 1:1   Grass, Trees    0.15mL  Red 1:1   Dog, Weeds    0.15mL  Red 1:1   Cat, Molds    0.15mL    Time Injection Given: 8:02  Red 1:1   Grass, Trees    0.2 mL  Red 1:1   Dog, Weeds    0.2 mL  Red 1:1   Cat, Molds    0.2 mL      Start Time: 7:21  End Time: 8:35      VITALS   Time BP Pulse pOx Reaction Treatment   7:55 127/91 86 97% none N/a   8:35 145/102 88 98% none N/a       ASSESSMENT/PLAN:  Oswaldo Garcia is a 33 year old male here for cluster immunotherapy.    1. Seasonal allergic rhinitis due to pollen - Phill tolerated today's procedure well without developing any signs or symptoms of an adverse reaction.    - return in 7-14 days to continue cluster protocol  - continue pre-medication with cetirizine as directed  - RAPID DESENSITIZATION    2. Allergic rhinitis due to animals    - RAPID DESENSITIZATION    3. Allergic rhinitis due to mold    - RAPID DESENSITIZATION    4. Allergic conjunctivitis, bilateral    - RAPID DESENSITIZATION      Thank you for allowing me to participate in the care of Oswaldo Garcia.      Ewa Greenfield MD, FAAAAI  Allergy/Immunology  [facility]  [facility]      Chart documentation done in part with Dragon Voice Recognition Software. Although reviewed after completion, some word and grammatical errors may remain.    Prior to initiation of cluster immunotherapy RN ensured that patient was feeling healthy, has premedicated with Zyrtec or Allegra yesterday twice daily and this morning. RN also ensured that patient has unexpired Epi-Pen, had no new medication changes, and did not have a reaction after the last allergy shots were given. If the patient has asthma it is well-controlled. The patient has not been ill in the past 7 days. Patient was given allergy injections per cluster immunotherapy protocol 30 minutes apart and vital signs were monitored. Patient was monitored in clinic for 30 minutes after last injection was given and assessed by provider before discharging.     Deidre Cardenas, ROMEO      Again, thank you for allowing me to participate in the care of your patient.        Sincerely,        Ewa Greenfield MD

## 2023-11-28 NOTE — PROGRESS NOTES
Oswaldo Garcia was seen in the Allergy Clinic at [facility].      Oswaldo Garcia is a 33 year old Not  or  male who is seen today for cluster immunotherapy. He had no adverse reactions after his last visit. He pre-medicated with cetirizine as directed prior to today's visit.     Past Medical History:   Diagnosis Date    Acute appendicitis with peritoneal abscess 02/11/09    D/C 02/13/09    Fracture of ankle, trimalleolar, closed      No family history on file.  Social History     Tobacco Use    Smoking status: Former     Packs/day: .5     Types: Cigarettes     Quit date: 8/5/2013     Years since quitting: 10.3    Smokeless tobacco: Never   Vaping Use    Vaping Use: Never used   Substance Use Topics    Alcohol use: Yes     Comment: occ    Drug use: No     Social History     Social History Narrative    ** Merged History Encounter **            Past medical, family, and social history were reviewed.        Current Outpatient Medications:     cetirizine (ZYRTEC) 10 MG tablet, Take 10 mg by mouth daily, Disp: , Rfl:     EPINEPHrine (ANY BX GENERIC EQUIV) 0.3 MG/0.3ML injection 2-pack, Inject 0.3 mLs (0.3 mg) into the muscle as needed for anaphylaxis May repeat one time in 5-15 minutes if response to initial dose is inadequate., Disp: 2 each, Rfl: 2    ORDER FOR ALLERGEN IMMUNOTHERAPY, Name of Mix: Mix #1  Mold, Cat Cat Hair, Standardized A.P. 10,000 BAU/mL, HS  2.0 ml  Alternaria Tenuis 1:10 w/v, HS  0.5 ml Epicoccum Nigrum 1:10 w/v, HS 0.5 ml Diluent: HSA qs to 5ml, Disp: 5 mL, Rfl: PRN    ORDER FOR ALLERGEN IMMUNOTHERAPY, Name of Mix: Mix #2  Dog, Weeds Dog Hair-Dander, UF  1:650 w/v, HS  1.0 ml  Kochia 1:20 w/v, HS 0.5 ml Nettle 1:20 w/v, HS 0.5 ml Plantain, English 1:20 w/v, HS 0.5 ml Ragweed, Mix (Giant, Short) 1:20 w/v, HS 0.5 ml Russian Thistle 1:20 w/v, HS 0.5 ml Sagebrush, Mugwort 1:20 w/v, HS 0.5 ml Sorrel, Sheep 1:20 w/v, HS 0.5 ml Diluent: HSA qs to 5ml, Disp: 5 mL,  Rfl: PRN    ORDER FOR ALLERGEN IMMUNOTHERAPY, Name of Mix: Mix #3  Grass, Tree  Portillo, White 1:20 w/v, HS  0.5 ml Birch Mix PRW 1:20 w/v, HS  0.5 ml Boxelder-Maple Mix BHR (Boxelder Hard Red) 1:20 w/v, HS  0.5 ml [place], Common 1:20 w/v, HS  0.5 ml Elm, American 1:20 w/v, HS  0.5 ml Oak Mix RVW 1:20 w/v, HS 0.5 ml Pine Mix 1:20 w/v, HS 0.5 ml [place] Tree, Black 1:20 w/v, HS 0.5 ml Jose Grass 1:20 w/v, HS 0.5 ml Harvey Grass (Std) 100,000 BAU/mL, HS 0.4 ml Diluent: HSA qs to 5ml, Disp: 5 mL, Rfl: PRN  No Known Allergies    EXAM:   /89 (BP Location: Right arm, Patient Position: Sitting, Cuff Size: Adult Large)   Pulse 92   SpO2 98%   Physical Exam  Vitals and nursing note reviewed.   Constitutional:       Appearance: Normal appearance.   HENT:      Head: Normocephalic and atraumatic.      Right Ear: External ear normal.      Left Ear: External ear normal.      Nose: No rhinorrhea.      Mouth/Throat:      Mouth: Mucous membranes are moist. No oral lesions.      Pharynx: Oropharynx is clear. Uvula midline. No posterior oropharyngeal erythema.   Eyes:      General: Lids are normal.      Extraocular Movements: Extraocular movements intact.      Conjunctiva/sclera: Conjunctivae normal.   Neck:      Comments: No asymmetry, masses, or scars  Cardiovascular:      Rate and Rhythm: Normal rate and regular rhythm.      Heart sounds: S1 normal and S2 normal. No murmur heard.  Pulmonary:      Effort: Pulmonary effort is normal. No respiratory distress.      Breath sounds: Normal breath sounds and air entry.   Musculoskeletal:      Comments: No musculoskeletal defects appreciated   Skin:     General: Skin is warm and dry.      Findings: No lesion or rash.   Neurological:      General: No focal deficit present.      Mental Status: He is alert.   Psychiatric:         Mood and Affect: Mood and affect normal.           WORKUP:  Cluster Immunotherapy    Cluster Allergen Immunotherapy:    After explaining risks and  benefits, and obtaining verbal and written consent, we proceeded with cluster immunotherapy.     VISIT  VIAL COLOR/STRENGTH  DOSES TO BE GIVEN    1  GREEN (1:1000), BLUE (1:100)  GREEN 0.1, GREEN 0.2, GREEN 0.4, BLUE 0.1    2  BLUE (1:100), YELLOW (1:10)  BLUE 0.2, BLUE 0.4, YELLOW 0.05    3  YELLOW (1:10)  YELLOW 0.1, YELLOW 0.15, YELLOW 0.25    4  YELLOW (1:10)  YELLOW 0.35, YELLOW 0.5    5  RED (1:1)  RED 0.05, RED 0.1    6  RED (1:1)  RED 0.15, RED 0.2    7  RED (1:1)  RED 0.3, RED 0.4    8  RED (1:1)  RED 0.5        VISIT 6    Time Injection Given: 7:21  Red 1:1   Grass, Trees    0.15mL  Red 1:1   Dog, Weeds    0.15mL  Red 1:1   Cat, Molds    0.15mL    Time Injection Given: 8:02  Red 1:1   Grass, Trees    0.2 mL  Red 1:1   Dog, Weeds    0.2 mL  Red 1:1   Cat, Molds    0.2 mL      Start Time: 7:21  End Time: 8:35      VITALS   Time BP Pulse pOx Reaction Treatment   7:55 127/91 86 97% none N/a   8:35 145/102 88 98% none N/a       ASSESSMENT/PLAN:  Oswaldo Garcia is a 33 year old male here for cluster immunotherapy.    1. Seasonal allergic rhinitis due to pollen - Phill tolerated today's procedure well without developing any signs or symptoms of an adverse reaction.    - return in 7-14 days to continue cluster protocol  - continue pre-medication with cetirizine as directed  - RAPID DESENSITIZATION    2. Allergic rhinitis due to animals    - RAPID DESENSITIZATION    3. Allergic rhinitis due to mold    - RAPID DESENSITIZATION    4. Allergic conjunctivitis, bilateral    - RAPID DESENSITIZATION      Thank you for allowing me to participate in the care of Oswaldo Garcia.      Ewa Greenfield MD, FAAAAI  Allergy/Immunology  [facility] - [facility]      Chart documentation done in part with Dragon Voice Recognition Software. Although reviewed after completion, some word and grammatical errors may remain.

## 2023-12-05 ENCOUNTER — OFFICE VISIT (OUTPATIENT)
Dept: ALLERGY | Facility: CLINIC | Age: 33
End: 2023-12-05
Payer: COMMERCIAL

## 2023-12-05 VITALS — SYSTOLIC BLOOD PRESSURE: 136 MMHG | HEART RATE: 87 BPM | OXYGEN SATURATION: 98 % | DIASTOLIC BLOOD PRESSURE: 93 MMHG

## 2023-12-05 DIAGNOSIS — J30.81 ALLERGIC RHINITIS DUE TO ANIMALS: ICD-10-CM

## 2023-12-05 DIAGNOSIS — H10.13 ALLERGIC CONJUNCTIVITIS, BILATERAL: ICD-10-CM

## 2023-12-05 DIAGNOSIS — J30.1 SEASONAL ALLERGIC RHINITIS DUE TO POLLEN: Primary | ICD-10-CM

## 2023-12-05 DIAGNOSIS — J30.89 ALLERGIC RHINITIS DUE TO MOLD: ICD-10-CM

## 2023-12-05 PROCEDURE — 95180 RAPID DESENSITIZATION: CPT | Performed by: ALLERGY & IMMUNOLOGY

## 2023-12-05 NOTE — LETTER
12/5/2023         RE: Oswaldo Garcia  [address] [place] [address]        Dear Colleague,    Thank you for referring your patient, Oswaldo Garcia, to the [facility]. Please see a copy of my visit note below.    Oswaldo Garcia was seen in the Allergy Clinic at [facility].      Oswaldo Garcia is a 33 year old Not  or  male who is seen today for cluster immunotherapy. He had no adverse reactions after his last visit. He pre-medicated with cetirizine as directed prior to today's visit.     Past Medical History:   Diagnosis Date     Acute appendicitis with peritoneal abscess 02/11/09    D/C 02/13/09     Fracture of ankle, trimalleolar, closed      No family history on file.  Social History     Tobacco Use     Smoking status: Former     Packs/day: .5     Types: Cigarettes     Quit date: 8/5/2013     Years since quitting: 10.3     Smokeless tobacco: Never   Vaping Use     Vaping Use: Never used   Substance Use Topics     Alcohol use: Yes     Comment: occ     Drug use: No     Social History     Social History Narrative    ** Merged History Encounter **            Past medical, family, and social history were reviewed.        Current Outpatient Medications:      cetirizine (ZYRTEC) 10 MG tablet, Take 10 mg by mouth daily, Disp: , Rfl:      EPINEPHrine (ANY BX GENERIC EQUIV) 0.3 MG/0.3ML injection 2-pack, Inject 0.3 mLs (0.3 mg) into the muscle as needed for anaphylaxis May repeat one time in 5-15 minutes if response to initial dose is inadequate., Disp: 2 each, Rfl: 2     ORDER FOR ALLERGEN IMMUNOTHERAPY, Name of Mix: Mix #1  Mold, Cat Cat Hair, Standardized A.P. 10,000 BAU/mL, HS  2.0 ml  Alternaria Tenuis 1:10 w/v, HS  0.5 ml Epicoccum Nigrum 1:10 w/v, HS 0.5 ml Diluent: HSA qs to 5ml, Disp: 5 mL, Rfl: PRN     ORDER FOR ALLERGEN IMMUNOTHERAPY, Name of Mix: Mix #2  Dog, Weeds Dog Hair-Dander, UF  1:650 w/v, HS  1.0 ml  Kochia 1:20 w/v, HS  0.5 ml Nettle 1:20 w/v, HS 0.5 ml Plantain, English 1:20 w/v, HS 0.5 ml Ragweed, Mix (Giant, Short) 1:20 w/v, HS 0.5 ml Russian Thistle 1:20 w/v, HS 0.5 ml Sagebrush, Mugwort 1:20 w/v, HS 0.5 ml Sorrel, Sheep 1:20 w/v, HS 0.5 ml Diluent: HSA qs to 5ml, Disp: 5 mL, Rfl: PRN     ORDER FOR ALLERGEN IMMUNOTHERAPY, Name of Mix: Mix #3  Grass, Tree  Portillo, White 1:20 w/v, HS  0.5 ml Birch Mix PRW 1:20 w/v, HS  0.5 ml Boxelder-Maple Mix BHR (Boxelder Hard Red) 1:20 w/v, HS  0.5 ml [place], Common 1:20 w/v, HS  0.5 ml Elm, American 1:20 w/v, HS  0.5 ml Oak Mix RVW 1:20 w/v, HS 0.5 ml Pine Mix 1:20 w/v, HS 0.5 ml Fischer Tree, Black 1:20 w/v, HS 0.5 ml Jose Grass 1:20 w/v, HS 0.5 ml Harvey Grass (Std) 100,000 BAU/mL, HS 0.4 ml Diluent: HSA qs to 5ml, Disp: 5 mL, Rfl: PRN  No Known Allergies    EXAM:   There were no vitals taken for this visit.  Physical Exam      WORKUP:  Cluster Immunotherapy    Cluster Allergen Immunotherapy:    After explaining risks and benefits, and obtaining verbal and written consent, we proceeded with cluster immunotherapy.     VISIT  VIAL COLOR/STRENGTH  DOSES TO BE GIVEN    1  GREEN (1:1000), BLUE (1:100)  GREEN 0.1, GREEN 0.2, GREEN 0.4, BLUE 0.1    2  BLUE (1:100), YELLOW (1:10)  BLUE 0.2, BLUE 0.4, YELLOW 0.05    3  YELLOW (1:10)  YELLOW 0.1, YELLOW 0.15, YELLOW 0.25    4  YELLOW (1:10)  YELLOW 0.35, YELLOW 0.5    5  RED (1:1)  RED 0.05, RED 0.1    6  RED (1:1)  RED 0.15, RED 0.2    7  RED (1:1)  RED 0.3, RED 0.4    8  RED (1:1)  RED 0.5        VISIT 7    Time Injection Given: 7:54  Red 1:1   Grass, Trees    0.3 mL  Red 1:1   Dog, Weeds    0.3 mL  Red 1:1   Cat, Molds    0.3 mL    Time Injection Given: 8:31  Red 1:1   Grass, Trees    0.4 mL  Red 1:1   Dog, Weeds    0.4 mL  Red 1:1   Cat, Molds    0.4 mL      Start Time: 7:54  End Time: 9:05      VITALS   Time BP Pulse pOx Reaction Treatment   8:25 148/92 89 99% none N/a   9:05 133/94 82 100% none N/a     ASSESSMENT/PLAN:  Oswaldo Garcia is  a 33 year old male here for cluster immunotherapy.    1. Seasonal allergic rhinitis due to pollen - Phill tolerated today's procedure well without developing any signs or symptoms of an adverse reaction.    - return in 7-14 days to receive first maintenance dose injection  - continue pre-medication with cetirizine until reaching monthly maintenance  - RAPID DESENSITIZATION  - Adult Allergy Clinic Follow-Up Order; Future    2. Allergic rhinitis due to animals    - RAPID DESENSITIZATION  - Adult Allergy Clinic Follow-Up Order; Future    3. Allergic rhinitis due to mold    - RAPID DESENSITIZATION  - Adult Allergy Clinic Follow-Up Order; Future    4. Allergic conjunctivitis, bilateral    - RAPID DESENSITIZATION  - Adult Allergy Clinic Follow-Up Order; Future      Follow-up in 6 months, sooner if needed      Thank you for allowing me to participate in the care of Oswaldo Garcia.      Ewa Greenfield MD, [facility]  Allergy/Immunology  [facility]      Chart documentation done in part with Dragon Voice Recognition Software. Although reviewed after completion, some word and grammatical errors may remain.      Prior to initiation of cluster immunotherapy RN ensured that patient was feeling healthy, has premedicated with Zyrtec or Allegra yesterday twice daily and this morning. RN also ensured that patient has unexpired Epi-Pen, had no new medication changes, and did not have a reaction after the last allergy shots were given. If the patient has asthma it is well-controlled. The patient has not been ill in the past 7 days. Patient was given allergy injections per cluster immunotherapy protocol 30 minutes apart and vital signs were monitored. Patient was monitored in clinic for 30 minutes after last injection was given and assessed by provider before discharging.     Deidre Cardenas RN      Again, thank you for allowing me to participate in the care of  your patient.        Sincerely,        Ewa Greenfield MD

## 2023-12-05 NOTE — PROGRESS NOTES
Oswaldo Garcia was seen in the Allergy Clinic at [facility].      Oswaldo Garcia is a 33 year old Not  or  male who is seen today for cluster immunotherapy. He had no adverse reactions after his last visit. He pre-medicated with cetirizine as directed prior to today's visit.     Past Medical History:   Diagnosis Date    Acute appendicitis with peritoneal abscess 02/11/09    D/C 02/13/09    Fracture of ankle, trimalleolar, closed      No family history on file.  Social History     Tobacco Use    Smoking status: Former     Packs/day: .5     Types: Cigarettes     Quit date: 8/5/2013     Years since quitting: 10.3    Smokeless tobacco: Never   Vaping Use    Vaping Use: Never used   Substance Use Topics    Alcohol use: Yes     Comment: occ    Drug use: No     Social History     Social History Narrative    ** Merged History Encounter **            Past medical, family, and social history were reviewed.        Current Outpatient Medications:     cetirizine (ZYRTEC) 10 MG tablet, Take 10 mg by mouth daily, Disp: , Rfl:     EPINEPHrine (ANY BX GENERIC EQUIV) 0.3 MG/0.3ML injection 2-pack, Inject 0.3 mLs (0.3 mg) into the muscle as needed for anaphylaxis May repeat one time in 5-15 minutes if response to initial dose is inadequate., Disp: 2 each, Rfl: 2    ORDER FOR ALLERGEN IMMUNOTHERAPY, Name of Mix: Mix #1  Mold, Cat Cat Hair, Standardized A.P. 10,000 BAU/mL, HS  2.0 ml  Alternaria Tenuis 1:10 w/v, HS  0.5 ml Epicoccum Nigrum 1:10 w/v, HS 0.5 ml Diluent: HSA qs to 5ml, Disp: 5 mL, Rfl: PRN    ORDER FOR ALLERGEN IMMUNOTHERAPY, Name of Mix: Mix #2  Dog, Weeds Dog Hair-Dander, UF  1:650 w/v, HS  1.0 ml  Kochia 1:20 w/v, HS 0.5 ml Nettle 1:20 w/v, HS 0.5 ml Plantain, English 1:20 w/v, HS 0.5 ml Ragweed, Mix (Giant, Short) 1:20 w/v, HS 0.5 ml Russian Thistle 1:20 w/v, HS 0.5 ml Sagebrush, Mugwort 1:20 w/v, HS 0.5 ml Sorrel, Sheep 1:20 w/v, HS 0.5 ml Diluent: HSA qs to 5ml, Disp: 5 mL,  Rfl: PRN    ORDER FOR ALLERGEN IMMUNOTHERAPY, Name of Mix: Mix #3  Grass, Tree  Portillo, White 1:20 w/v, HS  0.5 ml Birch Mix PRW 1:20 w/v, HS  0.5 ml Boxelder-Maple Mix BHR (Boxelder Hard Red) 1:20 w/v, HS  0.5 ml Trego, Common 1:20 w/v, HS  0.5 ml Elm, American 1:20 w/v, HS  0.5 ml Oak Mix RVW 1:20 w/v, HS 0.5 ml Pine Mix 1:20 w/v, HS 0.5 ml Hobbs Tree, Black 1:20 w/v, HS 0.5 ml Jose Grass 1:20 w/v, HS 0.5 ml Harvey Grass (Std) 100,000 BAU/mL, HS 0.4 ml Diluent: HSA qs to 5ml, Disp: 5 mL, Rfl: PRN  No Known Allergies    EXAM:   There were no vitals taken for this visit.  Physical Exam      WORKUP:  Cluster Immunotherapy    Cluster Allergen Immunotherapy:    After explaining risks and benefits, and obtaining verbal and written consent, we proceeded with cluster immunotherapy.     VISIT  VIAL COLOR/STRENGTH  DOSES TO BE GIVEN    1  GREEN (1:1000), BLUE (1:100)  GREEN 0.1, GREEN 0.2, GREEN 0.4, BLUE 0.1    2  BLUE (1:100), YELLOW (1:10)  BLUE 0.2, BLUE 0.4, YELLOW 0.05    3  YELLOW (1:10)  YELLOW 0.1, YELLOW 0.15, YELLOW 0.25    4  YELLOW (1:10)  YELLOW 0.35, YELLOW 0.5    5  RED (1:1)  RED 0.05, RED 0.1    6  RED (1:1)  RED 0.15, RED 0.2    7  RED (1:1)  RED 0.3, RED 0.4    8  RED (1:1)  RED 0.5        VISIT 7    Time Injection Given: 7:54  Red 1:1   Grass, Trees    0.3 mL  Red 1:1   Dog, Weeds    0.3 mL  Red 1:1   Cat, Molds    0.3 mL    Time Injection Given: 8:31  Red 1:1   Grass, Trees    0.4 mL  Red 1:1   Dog, Weeds    0.4 mL  Red 1:1   Cat, Molds    0.4 mL      Start Time: 7:54  End Time: 9:05      VITALS   Time BP Pulse pOx Reaction Treatment   8:25 148/92 89 99% none N/a   9:05 133/94 82 100% none N/a     ASSESSMENT/PLAN:  Oswaldo Garcia is a 33 year old male here for cluster immunotherapy.    1. Seasonal allergic rhinitis due to pollen - Phill tolerated today's procedure well without developing any signs or symptoms of an adverse reaction.    - return in 7-14 days to receive first maintenance dose  injection  - continue pre-medication with cetirizine until reaching monthly maintenance  - RAPID DESENSITIZATION  - Adult Allergy Clinic Follow-Up Order; Future    2. Allergic rhinitis due to animals    - RAPID DESENSITIZATION  - Adult Allergy Clinic Follow-Up Order; Future    3. Allergic rhinitis due to mold    - RAPID DESENSITIZATION  - Adult Allergy Clinic Follow-Up Order; Future    4. Allergic conjunctivitis, bilateral    - RAPID DESENSITIZATION  - Adult Allergy Clinic Follow-Up Order; Future      Follow-up in 6 months, sooner if needed      Thank you for allowing me to participate in the care of Oswaldo Garcia.      Ewa Greenfield MD, FAAAAI  Allergy/Immunology  [facility]      Chart documentation done in part with Dragon Voice Recognition Software. Although reviewed after completion, some word and grammatical errors may remain.

## 2023-12-12 ENCOUNTER — ALLIED HEALTH/NURSE VISIT (OUTPATIENT)
Dept: ALLERGY | Facility: CLINIC | Age: 33
End: 2023-12-12
Payer: COMMERCIAL

## 2023-12-12 DIAGNOSIS — J30.9 ALLERGIC RHINITIS: ICD-10-CM

## 2023-12-12 DIAGNOSIS — J30.89 ALLERGIC RHINITIS DUE TO MOLD: ICD-10-CM

## 2023-12-12 DIAGNOSIS — J30.81 ALLERGIC RHINITIS DUE TO ANIMALS: ICD-10-CM

## 2023-12-12 DIAGNOSIS — J30.1 SEASONAL ALLERGIC RHINITIS DUE TO POLLEN: Primary | ICD-10-CM

## 2023-12-12 PROCEDURE — 95117 IMMUNOTHERAPY INJECTIONS: CPT

## 2023-12-12 NOTE — PROGRESS NOTES
Oswaldo Garcia presents to clinic today at the request of Ewa Greenfield MD (ordering provider) for Allergy Immunotherapy injection(s).       This service provided today was under the care of Ewa Greenfield MD; the supervising provider of the day; who was available if needed.      Patient presented after waiting 30 minutes with no reaction to  injections. Discharged from clinic.    Val QUEZADA RN, BSN, PHN

## 2023-12-26 ENCOUNTER — ALLIED HEALTH/NURSE VISIT (OUTPATIENT)
Dept: ALLERGY | Facility: CLINIC | Age: 33
End: 2023-12-26
Payer: COMMERCIAL

## 2023-12-26 DIAGNOSIS — J30.81 ALLERGIC RHINITIS DUE TO ANIMALS: ICD-10-CM

## 2023-12-26 DIAGNOSIS — J30.1 SEASONAL ALLERGIC RHINITIS DUE TO POLLEN: Primary | ICD-10-CM

## 2023-12-26 DIAGNOSIS — J30.9 ALLERGIC RHINITIS: ICD-10-CM

## 2023-12-26 DIAGNOSIS — J30.89 ALLERGIC RHINITIS DUE TO MOLD: ICD-10-CM

## 2023-12-26 PROCEDURE — 95117 IMMUNOTHERAPY INJECTIONS: CPT

## 2023-12-26 NOTE — PROGRESS NOTES
Oswaldo Garcia presents to clinic today at the request of Ewa Greenfield MD (ordering provider) for Allergy Immunotherapy injection(s).       This service provided today was under the care of Ana Garg MD; the supervising provider of the day; who was available if needed.      Patient presented after waiting 30 minutes with no reaction to  injections. Discharged from clinic.    Val QUEZADA RN, BSN, PHN

## 2024-01-23 ENCOUNTER — ALLIED HEALTH/NURSE VISIT (OUTPATIENT)
Dept: ALLERGY | Facility: CLINIC | Age: 34
End: 2024-01-23
Payer: COMMERCIAL

## 2024-01-23 DIAGNOSIS — J30.9 ALLERGIC RHINITIS: ICD-10-CM

## 2024-01-23 DIAGNOSIS — J30.81 ALLERGIC RHINITIS DUE TO ANIMALS: ICD-10-CM

## 2024-01-23 DIAGNOSIS — J30.89 ALLERGIC RHINITIS DUE TO MOLD: ICD-10-CM

## 2024-01-23 DIAGNOSIS — J30.1 SEASONAL ALLERGIC RHINITIS DUE TO POLLEN: Primary | ICD-10-CM

## 2024-01-23 PROCEDURE — 95117 IMMUNOTHERAPY INJECTIONS: CPT

## 2024-02-20 ENCOUNTER — ALLIED HEALTH/NURSE VISIT (OUTPATIENT)
Dept: ALLERGY | Facility: CLINIC | Age: 34
End: 2024-02-20
Payer: COMMERCIAL

## 2024-02-20 DIAGNOSIS — J30.1 SEASONAL ALLERGIC RHINITIS DUE TO POLLEN: Primary | ICD-10-CM

## 2024-02-20 DIAGNOSIS — J30.81 ALLERGIC RHINITIS DUE TO ANIMALS: ICD-10-CM

## 2024-02-20 DIAGNOSIS — J30.89 ALLERGIC RHINITIS DUE TO MOLD: ICD-10-CM

## 2024-02-20 PROCEDURE — 95117 IMMUNOTHERAPY INJECTIONS: CPT

## 2024-02-20 NOTE — PROGRESS NOTES
Oswaldo Garcia presents to clinic today at the request of Ewa Greenfield MD (ordering provider) for Allergy Immunotherapy injection(s).       This service provided today was under the care of Ewa Greenfield MD; the supervising provider of the day; who was available if needed.      Patient presented after waiting 30 minutes with no reaction to  injections. Discharged from clinic.    Caroline Joseph RN

## 2024-03-19 ENCOUNTER — ALLIED HEALTH/NURSE VISIT (OUTPATIENT)
Dept: ALLERGY | Facility: CLINIC | Age: 34
End: 2024-03-19
Payer: COMMERCIAL

## 2024-03-19 DIAGNOSIS — H10.13 ALLERGIC CONJUNCTIVITIS, BILATERAL: ICD-10-CM

## 2024-03-19 DIAGNOSIS — J30.89 ALLERGIC RHINITIS DUE TO MOLD: ICD-10-CM

## 2024-03-19 DIAGNOSIS — J30.81 ALLERGIC RHINITIS DUE TO ANIMALS: ICD-10-CM

## 2024-03-19 DIAGNOSIS — J30.1 SEASONAL ALLERGIC RHINITIS DUE TO POLLEN: Primary | ICD-10-CM

## 2024-03-19 DIAGNOSIS — J30.9 ALLERGIC RHINITIS: ICD-10-CM

## 2024-03-19 DIAGNOSIS — J30.1 SEASONAL ALLERGIC RHINITIS DUE TO POLLEN: ICD-10-CM

## 2024-03-19 PROCEDURE — 95117 IMMUNOTHERAPY INJECTIONS: CPT

## 2024-03-19 NOTE — TELEPHONE ENCOUNTER
ALLERGY SOLUTION RE-ORDER REQUEST    Oswaldo Garcia 1990 MRN: [id number]    DATE NEEDED:  3 weeks  Vial Color Content    Vial Size  Red 1:1 Grass, Trees    5 mL  Red 1:1 Dog, Weeds   5 mL  Red 1:1 Cat, Molds    5 mL        Serum reorder consent signed and patient/parent was advised that new serums would be ordered through the pharmacy and billed to their insurance company when they arrive in clinic. Yes    Shot Clinic Location:  [facility].  Ship to Location: [facility].  Serum billed to:  [facility].    Special Instructions:  NA        Requester Signature  Nolvia RANDLOPH MA

## 2024-03-29 DIAGNOSIS — J30.89 ALLERGIC RHINITIS DUE TO MOLD: ICD-10-CM

## 2024-03-29 DIAGNOSIS — J30.81 ALLERGIC RHINITIS DUE TO ANIMALS: Primary | ICD-10-CM

## 2024-03-29 DIAGNOSIS — J30.1 SEASONAL ALLERGIC RHINITIS DUE TO POLLEN: ICD-10-CM

## 2024-03-29 PROCEDURE — 95165 ANTIGEN THERAPY SERVICES: CPT | Performed by: ALLERGY & IMMUNOLOGY

## 2024-03-29 NOTE — PROGRESS NOTES
Allergy serums billed to [facility].     Vials billed below:    Vial Color Content                      Vial Size Expiration Date  Red 1:1 Grass, Trees 5mL   3/29/25  Red 1:1 Dog, Weeds 5mL  3/29/25  Red 1:1 Cat, Molds 5mL  3/29/25      Billed 30 units    Checked by Leo Betancourt / LPN        Signature  Leo Betancourt LPN

## 2024-04-16 ENCOUNTER — ALLIED HEALTH/NURSE VISIT (OUTPATIENT)
Dept: ALLERGY | Facility: CLINIC | Age: 34
End: 2024-04-16
Payer: COMMERCIAL

## 2024-04-16 DIAGNOSIS — J30.81 ALLERGIC RHINITIS DUE TO ANIMALS: Primary | ICD-10-CM

## 2024-04-16 DIAGNOSIS — J30.1 SEASONAL ALLERGIC RHINITIS DUE TO POLLEN: ICD-10-CM

## 2024-04-16 DIAGNOSIS — J30.9 ALLERGIC RHINITIS: ICD-10-CM

## 2024-04-16 DIAGNOSIS — J30.89 ALLERGIC RHINITIS DUE TO MOLD: ICD-10-CM

## 2024-04-16 PROCEDURE — 95117 IMMUNOTHERAPY INJECTIONS: CPT

## 2024-04-16 NOTE — TELEPHONE ENCOUNTER
Allergy serums received at [facility].    Vials received below:    Vial Color Content                      Vial Size Expiration Date  Red 1:1 Grass, Trees 5mL  03/29/2025  Red 1:1 Cat, Molds 5mL  03/29/2025  Red 1:1 Dog, Weeds 5mL  03/29/2025        Signature  Val QUEZADA RN, BSN, PHN

## 2024-04-17 SDOH — HEALTH STABILITY: PHYSICAL HEALTH: ON AVERAGE, HOW MANY DAYS PER WEEK DO YOU ENGAGE IN MODERATE TO STRENUOUS EXERCISE (LIKE A BRISK WALK)?: 4 DAYS

## 2024-04-17 SDOH — HEALTH STABILITY: PHYSICAL HEALTH: ON AVERAGE, HOW MANY MINUTES DO YOU ENGAGE IN EXERCISE AT THIS LEVEL?: 150+ MIN

## 2024-04-17 ASSESSMENT — SOCIAL DETERMINANTS OF HEALTH (SDOH): HOW OFTEN DO YOU GET TOGETHER WITH FRIENDS OR RELATIVES?: ONCE A WEEK

## 2024-04-19 ENCOUNTER — OFFICE VISIT (OUTPATIENT)
Dept: FAMILY MEDICINE | Facility: CLINIC | Age: 34
End: 2024-04-19
Payer: COMMERCIAL

## 2024-04-19 VITALS
RESPIRATION RATE: 17 BRPM | HEIGHT: 78 IN | SYSTOLIC BLOOD PRESSURE: 126 MMHG | HEART RATE: 85 BPM | WEIGHT: 260.1 LBS | BODY MASS INDEX: 30.09 KG/M2 | OXYGEN SATURATION: 98 % | DIASTOLIC BLOOD PRESSURE: 83 MMHG | TEMPERATURE: 97.8 F

## 2024-04-19 DIAGNOSIS — Z00.00 ROUTINE GENERAL MEDICAL EXAMINATION AT A HEALTH CARE FACILITY: Primary | ICD-10-CM

## 2024-04-19 DIAGNOSIS — Z13.21 ENCOUNTER FOR VITAMIN DEFICIENCY SCREENING: ICD-10-CM

## 2024-04-19 DIAGNOSIS — H61.23 BILATERAL IMPACTED CERUMEN: ICD-10-CM

## 2024-04-19 DIAGNOSIS — Z13.220 SCREENING FOR HYPERLIPIDEMIA: ICD-10-CM

## 2024-04-19 DIAGNOSIS — Z13.1 SCREENING FOR DIABETES MELLITUS: ICD-10-CM

## 2024-04-19 LAB — HOLD SPECIMEN: NORMAL

## 2024-04-19 PROCEDURE — 90471 IMMUNIZATION ADMIN: CPT | Performed by: PHYSICIAN ASSISTANT

## 2024-04-19 PROCEDURE — 82306 VITAMIN D 25 HYDROXY: CPT | Performed by: PHYSICIAN ASSISTANT

## 2024-04-19 PROCEDURE — 69209 REMOVE IMPACTED EAR WAX UNI: CPT | Mod: 50 | Performed by: PHYSICIAN ASSISTANT

## 2024-04-19 PROCEDURE — 80048 BASIC METABOLIC PNL TOTAL CA: CPT | Performed by: PHYSICIAN ASSISTANT

## 2024-04-19 PROCEDURE — 90472 IMMUNIZATION ADMIN EACH ADD: CPT | Performed by: PHYSICIAN ASSISTANT

## 2024-04-19 PROCEDURE — 90686 IIV4 VACC NO PRSV 0.5 ML IM: CPT | Performed by: PHYSICIAN ASSISTANT

## 2024-04-19 PROCEDURE — 91320 SARSCV2 VAC 30MCG TRS-SUC IM: CPT | Performed by: PHYSICIAN ASSISTANT

## 2024-04-19 PROCEDURE — 99395 PREV VISIT EST AGE 18-39: CPT | Mod: 25 | Performed by: PHYSICIAN ASSISTANT

## 2024-04-19 PROCEDURE — 90480 ADMN SARSCOV2 VAC 1/ONLY CMP: CPT | Performed by: PHYSICIAN ASSISTANT

## 2024-04-19 PROCEDURE — 80061 LIPID PANEL: CPT | Performed by: PHYSICIAN ASSISTANT

## 2024-04-19 PROCEDURE — 90715 TDAP VACCINE 7 YRS/> IM: CPT | Performed by: PHYSICIAN ASSISTANT

## 2024-04-19 PROCEDURE — 36415 COLL VENOUS BLD VENIPUNCTURE: CPT | Performed by: PHYSICIAN ASSISTANT

## 2024-04-19 NOTE — PROGRESS NOTES
Patient identified using two patient identifiers.  Ear exam showing wax occlusion completed by provider.  Solution: warm water was placed in the left ear(s) via irrigation tool: elephant ear.    Lori Nicole MA

## 2024-04-19 NOTE — PATIENT INSTRUCTIONS
I will notify you of lab results via [employer]   Patient Education    Preventive Care Advice   This is general advice given by our system to help you stay healthy. However, your care team may have specific advice just for you. Please talk to your care team about your preventive care needs.  Nutrition  Eat 5 or more servings of fruits and vegetables each day.  Try wheat bread, brown rice and whole grain pasta (instead of white bread, rice, and pasta).  Get enough calcium and vitamin D. Check the label on foods and aim for 100% of the RDA (recommended daily allowance).  Lifestyle  Exercise at least 150 minutes each week   (30 minutes a day, 5 days a week).  Do muscle strengthening activities 2 days a week. These help control your weight and prevent disease.  No smoking.  Wear sunscreen to prevent skin cancer.  Have a dental exam and cleaning every 6 months.  Yearly exams  See your health care team every year to talk about:  Any changes in your health.  Any medicines your care team has prescribed.  Preventive care, family planning, and ways to prevent chronic diseases.  Shots (vaccines)   HPV shots (up to age 26), if you've never had them before.  Hepatitis B shots (up to age 59), if you've never had them before.  COVID-19 shot: Get this shot when it's due.  Flu shot: Get a flu shot every year.  Tetanus shot: Get a tetanus shot every 10 years.  Pneumococcal, hepatitis A, and RSV shots: Ask your care team if you need these based on your risk.  Shingles shot (for age 50 and up).  General health tests  Diabetes screening:  Starting at age 35, Get screened for diabetes at least every 3 years.  If you are younger than age 35, ask your care team if you should be screened for diabetes.  Cholesterol test: At age 39, start having a cholesterol test every 5 years, or more often if advised.  Bone density scan (DEXA): At age 50, ask your care team if you should have this scan for osteoporosis (brittle bones).  Hepatitis C: Get  tested at least once in your life.  STIs (sexually transmitted infections)  Before age 24: Ask your care team if you should be screened for STIs.  After age 24: Get screened for STIs if you're at risk. You are at risk for STIs (including HIV) if:  You are sexually active with more than one person.  You don't use condoms every time.  You or a partner was diagnosed with a sexually transmitted infection.  If you are at risk for HIV, ask about PrEP medicine to prevent HIV.  Get tested for HIV at least once in your life, whether you are at risk for HIV or not.  Cancer screening tests  Cervical cancer screening: If you have a cervix, begin getting regular cervical cancer screening tests at age 21. Most people who have regular screenings with normal results can stop after age 65. Talk about this with your provider.  Breast cancer scan (mammogram): If you've ever had breasts, begin having regular mammograms starting at age 40. This is a scan to check for breast cancer.  Colon cancer screening: It is important to start screening for colon cancer at age 45.  Have a colonoscopy test every 10 years (or more often if you're at risk) Or, ask your provider about stool tests like a FIT test every year or Cologuard test every 3 years.  To learn more about your testing options, visit: https://www.[employer]/520555.pdf.  For help making a decision, visit: https://bit.ly/jl97478.  Prostate cancer screening test: If you have a prostate and are age 55 to 69, ask your provider if you would benefit from a yearly prostate cancer screening test.  Lung cancer screening: If you are a current or former smoker age 50 to 80, ask your care team if ongoing lung cancer screenings are right for you.  For informational purposes only. Not to replace the advice of your health care provider. Copyright   2023 [employer]. All rights reserved. Clinically reviewed by the [facility] Transitions Program. Progression 575138 - REV  01/24.    Learning About Stress  What is stress?     Stress is your body's response to a hard situation. Your body can have a physical, emotional, or mental response. Stress is a fact of life for most people, and it affects everyone differently. What causes stress for you may not be stressful for someone else.  A lot of things can cause stress. You may feel stress when you go on a job interview, take a test, or run a race. This kind of short-term stress is normal and even useful. It can help you if you need to work hard or react quickly. For example, stress can help you finish an important job on time.  Long-term stress is caused by ongoing stressful situations or events. Examples of long-term stress include long-term health problems, ongoing problems at work, or conflicts in your family. Long-term stress can harm your health.  How does stress affect your health?  When you are stressed, your body responds as though you are in danger. It makes hormones that speed up your heart, make you breathe faster, and give you a burst of energy. This is called the fight-or-flight stress response. If the stress is over quickly, your body goes back to normal and no harm is done.  But if stress happens too often or lasts too long, it can have bad effects. Long-term stress can make you more likely to get sick, and it can make symptoms of some diseases worse. If you tense up when you are stressed, you may develop neck, shoulder, or low back pain. Stress is linked to high blood pressure and heart disease.  Stress also harms your emotional health. It can make you baum, tense, or depressed. Your relationships may suffer, and you may not do well at work or school.  What can you do to manage stress?  You can try these things to help manage stress:   Do something active. Exercise or activity can help reduce stress. Walking is a great way to get started. Even everyday activities such as housecleaning or yard work can help.  Try yoga or maria  chi. These techniques combine exercise and meditation. You may need some training at first to learn them.  Do something you enjoy. For example, listen to music or go to a movie. Practice your hobby or do volunteer work.  Meditate. This can help you relax, because you are not worrying about what happened before or what may happen in the future.  Do guided imagery. Imagine yourself in any setting that helps you feel calm. You can use online videos, books, or a teacher to guide you.  Do breathing exercises. For example:  From a standing position, bend forward from the waist with your knees slightly bent. Let your arms dangle close to the floor.  Breathe in slowly and deeply as you return to a standing position. Roll up slowly and lift your head last.  Hold your breath for just a few seconds in the standing position.  Breathe out slowly and bend forward from the waist.  Let your feelings out. Talk, laugh, cry, and express anger when you need to. Talking with supportive friends or family, a counselor, or a miguel leader about your feelings is a healthy way to relieve stress. Avoid discussing your feelings with people who make you feel worse.  Write. It may help to write about things that are bothering you. This helps you find out how much stress you feel and what is causing it. When you know this, you can find better ways to cope.  What can you do to prevent stress?  You might try some of these things to help prevent stress:  Manage your time. This helps you find time to do the things you want and need to do.  Get enough sleep. Your body recovers from the stresses of the day while you are sleeping.  Get support. Your family, friends, and community can make a difference in how you experience stress.  Limit your news feed. Avoid or limit time on social media or news that may make you feel stressed.  Do something active. Exercise or activity can help reduce stress. Walking is a great way to get started.  Where can you learn  "more?  Go to https://www.[employer].net/patiented  Enter N032 in the search box to learn more about \"Learning About Stress.\"  Current as of: October 24, 2023               Content Version: 14.0    0578-1937 [employer].   Care instructions adapted under license by your healthcare professional. If you have questions about a medical condition or this instruction, always ask your healthcare professional. [employer] disclaims any warranty or liability for your use of this information.      "

## 2024-04-19 NOTE — PROGRESS NOTES
"Preventive Care Visit  [facility]  Jacquie Flores PA-C, Family Medicine  Apr 19, 2024      Assessment & Plan     Routine general medical examination at a health care facility  Normal exam except ear wax and overweight     Screening for diabetes mellitus    - Basic metabolic panel  - Extra Tube  - Basic metabolic panel  - Extra Tube    Screening for hyperlipidemia    - Lipid panel reflex to direct LDL Fasting  - Lipid panel reflex to direct LDL Fasting    Encounter for vitamin deficiency screening    - Vitamin D Deficiency  - Vitamin D Deficiency    Bilateral impacted cerumen  Lavaged per nursing staff   - PA REMOVAL IMPACTED CERUMEN IRRIGATION/LVG UNILAT (RN/MA)        Ordering of each unique test        BMI  Estimated body mass index is 29.3 kg/m  as calculated from the following:    Height as of this encounter: 2.007 m (6' 7\").    Weight as of this encounter: 118 kg (260 lb 1.6 oz).   Weight management plan: Discussed healthy diet and exercise guidelines    Counseling  Appropriate preventive services were discussed with this patient, including applicable screening as appropriate for fall prevention, nutrition, physical activity, Tobacco-use cessation, weight loss and cognition.  Checklist reviewing preventive services available has been given to the patient.  Reviewed patient's diet, addressing concerns and/or questions.       I will notify you of lab results via [employer]     Subjective   Phill is a 33 year old, presenting for the following:  Physical        4/19/2024     6:56 AM   Additional Questions   Roomed by Jennifer        Health Care Directive  Patient does not have a Health Care Directive or Living Will: Discussed advance care planning with patient; information given to patient to review.    HPI  Patient new to me presents for annual exam.  No complaints or concerns today other than some ear wax and history of allergies to all 4 legged creatures and everything outside.  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 Good   Feel stress (tense, anxious, or unable to sleep) To some extent   (!) STRESS CONCERN      4/17/2024   Nutrition   Three or more servings of calcium each day? Yes   Diet: Regular (no restrictions)   How many servings of fruit and vegetables per day? (!) 2-3   How many sweetened beverages each day? (!) 2         4/17/2024   Exercise   Days per week of moderate/strenous exercise 4 days   Average minutes spent exercising at this level 150+ min         4/17/2024   Social Factors   Frequency of gathering with friends or relatives Once a week   Worry food won't last until get money to buy more No   Food not last or not have enough money for food? No   Do you have housing?  Yes   Are you worried about losing your housing? No   Lack of transportation? No   Unable to get utilities (heat,electricity)? No         4/17/2024   Dental   Dentist two times every year? Yes         4/17/2024   TB Screening   Were you born outside of the [place]? No         Today's PHQ-2 Score:       4/19/2024     6:47 AM   PHQ-2 ( 1999 [employer])   Q1: Little interest or pleasure in doing things 2   Q2: Feeling down, depressed or hopeless 0   PHQ-2 Score 2   Q1: Little interest or pleasure in doing things More than half the days   Q2: Feeling down, depressed or hopeless Not at all   PHQ-2 Score 2           4/17/2024   Substance Use   Alcohol more than 3/day or more than 7/wk No   Do you use any other substances recreationally? No     Social History     Tobacco Use    Smoking status: Former     Current packs/day: 0.00     Types: Cigarettes, Vaping Device     Quit date: 8/5/2013     Years since quitting: 10.7    Smokeless tobacco: Never   Vaping Use    Vaping status: Some Days    Substances: Nicotine, Flavoring    Substance Use Topics    Alcohol use: Yes     Comment: occ    Drug use: No           4/17/2024   STI Screening   New sexual partner(s) since last STI/HIV test? No         4/17/2024   Contraception/Family Planning   Questions about contraception or family planning No        Reviewed and updated as needed this visit by Provider   Tobacco   Meds   Med Hx  Surg Hx  Fam Hx  Soc Hx Sexual Activity          BP Readings from Last 3 Encounters:   04/19/24 126/83   12/05/23 (!) 136/93   11/28/23 135/89    Wt Readings from Last 3 Encounters:   04/19/24 118 kg (260 lb 1.6 oz)   08/07/23 108.8 kg (239 lb 14.4 oz)   04/19/23 106.4 kg (234 lb 8 oz)                  Patient Active Problem List   Diagnosis    Chronic appendicitis    CARDIOVASCULAR SCREENING; LDL GOAL LESS THAN 160    Closed trimalleolar fracture 8/4/13    Seasonal allergic rhinitis due to pollen    Allergic rhinitis due to mold    Allergic rhinitis due to animals    Allergic conjunctivitis, bilateral     Past Surgical History:   Procedure Laterality Date    APPENDECTOMY  3/10/2009    COLONOSCOPY  11/06/09    OPEN REDUCTION INTERNAL FIXATION ANKLE  8/16/2013    Procedure: OPEN REDUCTION INTERNAL FIXATION ANKLE;  Open Reduction Internal Fixation Right Ankle;  Surgeon: George De La Rosa DPM;  Location:  OR       Social History     Tobacco Use    Smoking status: Former     Current packs/day: 0.00     Types: Cigarettes, Vaping Device     Quit date: 8/5/2013     Years since quitting: 10.7    Smokeless tobacco: Never   Substance Use Topics    Alcohol use: Yes     Comment: occ     Family History   Problem Relation Age of Onset    No Known Problems Mother     Diabetes Type 2  Father     No Known Problems Sister     No Known Problems Brother     Coronary Artery Disease Paternal Grandfather         MI age 65    Colon Cancer No family hx of     Prostate Cancer No family hx of          Current Outpatient Medications   Medication Sig Dispense Refill    cetirizine  (ZYRTEC) 10 MG tablet Take 10 mg by mouth daily      EPINEPHrine (ANY BX GENERIC EQUIV) 0.3 MG/0.3ML injection 2-pack Inject 0.3 mLs (0.3 mg) into the muscle as needed for anaphylaxis May repeat one time in 5-15 minutes if response to initial dose is inadequate. 2 each 2    ORDER FOR ALLERGEN IMMUNOTHERAPY Name of Mix: Mix #1  Mold, Cat  Cat Hair, Standardized A.P. 10,000 BAU/mL, HS  2.0 ml   Alternaria Tenuis 1:10 w/v, HS  0.5 ml  Epicoccum Nigrum 1:10 w/v, HS 0.5 ml  Diluent: HSA qs to 5ml      ORDER FOR ALLERGEN IMMUNOTHERAPY Name of Mix: Mix #2  Dog, Weeds  Dog Hair-Dander, UF  1:650 w/v, HS  1.0 ml   Kochia 1:20 w/v, HS 0.5 ml  Nettle 1:20 w/v, HS 0.5 ml  Plantain, English 1:20 w/v, HS 0.5 ml  Ragweed, Mix (Giant, Short) 1:20 w/v, HS 0.5 ml  Russian Thistle 1:20 w/v, HS 0.5 ml  Sagebrush, Mugwort 1:20 w/v, HS 0.5 ml  Sorrel, Sheep 1:20 w/v, HS 0.5 ml  Diluent: HSA qs to 5ml      ORDER FOR ALLERGEN IMMUNOTHERAPY Name of Mix: Mix #3  Grass, Tree   [place]illo, White 1:20 w/v, HS  0.5 ml  Birch Mix PRW 1:20 w/v, HS  0.5 ml  Boxelder-Maple Mix BHR (Boxelder Hard Red) 1:20 w/v, HS  0.5 ml  [place], Common 1:20 w/v, HS  0.5 ml  Elm, American 1:20 w/v, HS  0.5 ml  Oak Mix RVW 1:20 w/v, HS 0.5 ml  Pine Mix 1:20 w/v, HS 0.5 ml  [place] Tree, Black 1:20 w/v, HS 0.5 ml  Jose Grass 1:20 w/v, HS 0.5 ml  [place]vey Grass (Std) 100,000 BAU/mL, HS 0.4 ml  Diluent: HSA qs to 5ml           Review of Systems  CONSTITUTIONAL: NEGATIVE for fever, chills, change in weight  INTEGUMENTARY/SKIN: NEGATIVE for worrisome rashes, moles or lesions  EYES: allergy symptoms   ENT/MOUTH: allergy symptoms- left ear drum occluded with wax    RESP: NEGATIVE for significant cough or SOB  BREAST: NEGATIVE for masses, tenderness or discharge  CV: NEGATIVE for chest pain, palpitations or peripheral edema  GI: NEGATIVE for nausea, abdominal pain, heartburn, or change in bowel habits  : NEGATIVE for frequency, dysuria, or hematuria  MUSCULOSKELETAL:  "NEGATIVE for significant arthralgias or myalgia  NEURO: NEGATIVE for weakness, dizziness or paresthesias  ENDOCRINE: NEGATIVE for temperature intolerance, skin/hair changes  HEME: NEGATIVE for bleeding problems  PSYCHIATRIC: NEGATIVE for changes in mood or affect     Objective    Exam  /83 (BP Location: Right arm, Patient Position: Sitting, Cuff Size: Adult Large)   Pulse 85   Temp 97.8  F (36.6  C) (Oral)   Resp 17   Ht 2.007 m (6' 7\")   Wt 118 kg (260 lb 1.6 oz)   SpO2 98%   BMI 29.30 kg/m     Estimated body mass index is 29.3 kg/m  as calculated from the following:    Height as of this encounter: 2.007 m (6' 7\").    Weight as of this encounter: 118 kg (260 lb 1.6 oz).    Physical Exam  GENERAL: alert and no distress  EYES: Eyes grossly normal to inspection, PERRL and conjunctivae and sclerae normal  HENT: ear canals and TM's normal, nose and mouth without ulcers or lesions  NECK: no adenopathy, no asymmetry, masses, or scars  RESP: lungs clear to auscultation - no rales, rhonchi or wheezes  CV: regular rate and rhythm, normal S1 S2, no S3 or S4, no murmur, click or rub, no peripheral edema  ABDOMEN: soft, nontender, no hepatosplenomegaly, no masses and bowel sounds normal   (male): normal male genitalia without lesions or urethral discharge, no hernia  MS: no gross musculoskeletal defects noted, no edema  SKIN: no suspicious lesions or rashes  NEURO: Normal strength and tone, mentation intact and speech normal  PSYCH: mentation appears normal, affect normal/bright        Signed Electronically by: Jacquie Flores PA-C    "

## 2024-04-19 NOTE — PROGRESS NOTES
Prior to immunization administration, verified patients identity using patient s name and date of birth. Please see Immunization Activity for additional information.     Screening Questionnaire for Adult Immunization    Are you sick today?   No   Do you have allergies to medications, food, a vaccine component or latex?   No   Have you ever had a serious reaction after receiving a vaccination?   No   Do you have a long-term health problem with heart, lung, kidney, or metabolic disease (e.g., diabetes), asthma, a blood disorder, no spleen, complement component deficiency, a cochlear implant, or a spinal fluid leak?  Are you on long-term aspirin therapy?   No   Do you have cancer, leukemia, HIV/AIDS, or any other immune system problem?   No   Do you have a parent, brother, or sister with an immune system problem?   No   In the past 3 months, have you taken medications that affect  your immune system, such as prednisone, other steroids, or anticancer drugs; drugs for the treatment of rheumatoid arthritis, Crohn s disease, or psoriasis; or have you had radiation treatments?   No   Have you had a seizure, or a brain or other nervous system problem?   No   During the past year, have you received a transfusion of blood or blood    products, or been given immune (gamma) globulin or antiviral drug?   No   For women: Are you pregnant or is there a chance you could become       pregnant during the next month?   No   Have you received any vaccinations in the past 4 weeks?   No     Immunization questionnaire answers were all negative.      Patient instructed to remain in clinic for 15 minutes afterwards, and to report any adverse reactions.     Screening performed by Lori Nicole MA on 4/19/2024 at 7:18 AM.

## 2024-04-20 LAB
ANION GAP SERPL CALCULATED.3IONS-SCNC: 8 MMOL/L (ref 7–15)
BUN SERPL-MCNC: 9.1 MG/DL (ref 6–20)
CALCIUM SERPL-MCNC: 9.8 MG/DL (ref 8.6–10)
CHLORIDE SERPL-SCNC: 106 MMOL/L (ref 98–107)
CHOLEST SERPL-MCNC: 191 MG/DL
CREAT SERPL-MCNC: 0.98 MG/DL (ref 0.67–1.17)
DEPRECATED HCO3 PLAS-SCNC: 25 MMOL/L (ref 22–29)
EGFRCR SERPLBLD CKD-EPI 2021: >90 ML/MIN/1.73M2
FASTING STATUS PATIENT QL REPORTED: YES
GLUCOSE SERPL-MCNC: 99 MG/DL (ref 70–99)
HDLC SERPL-MCNC: 37 MG/DL
LDLC SERPL CALC-MCNC: 126 MG/DL
NONHDLC SERPL-MCNC: 154 MG/DL
POTASSIUM SERPL-SCNC: 4.5 MMOL/L (ref 3.4–5.3)
SODIUM SERPL-SCNC: 139 MMOL/L (ref 135–145)
TRIGL SERPL-MCNC: 140 MG/DL
VIT D+METAB SERPL-MCNC: 15 NG/ML (ref 20–50)

## 2024-04-21 DIAGNOSIS — E55.9 VITAMIN D DEFICIENCY: Primary | ICD-10-CM

## 2024-04-21 RX ORDER — ERGOCALCIFEROL 1.25 MG/1
50000 CAPSULE, LIQUID FILLED ORAL WEEKLY
Qty: 8 CAPSULE | Refills: 0 | Status: SHIPPED | OUTPATIENT
Start: 2024-04-21

## 2024-04-21 RX ORDER — CHOLECALCIFEROL (VITAMIN D3) 50 MCG
1 TABLET ORAL DAILY
Qty: 90 TABLET | Refills: 3 | Status: SHIPPED | OUTPATIENT
Start: 2024-04-21

## 2024-04-21 NOTE — RESULT ENCOUNTER NOTE
Dear Phill  Your vitamin D level is very low.  Low vitamin D can put you at risk for low bone density and commonly increase fatigue, depression and muscle pain. I recommend supplementing with vitamin D 50,000 units vitamin D once weekly for 8 weeks then taking 2000 units daily.  I can send in a prescription for 50,000 units and you may purchase the 2000 units over the counter or I can send in a prescription.   I recommend rechecking a vitamin D level in 4 months.  You may schedule a lab only appointment for this.     Your cholesterol has increased from a year ago but still acceptable  Your HDL (good cholesterol) was 37- ideally we like to see this above 50.  You can increase this with regular aerobic exercise.  Maintaining a healthy diet with lean proteins, whole grains and healthy fats such as olive oil as well as regular exercise and maintaining an appropriate weight all contribute to healthier cholesterol levels.      Your electrolytes, blood sugar and kidney function were normal.     Please call or MyChart my office with any questions or concerns.    Jacquie Flores, PAC

## 2024-05-20 ENCOUNTER — ALLIED HEALTH/NURSE VISIT (OUTPATIENT)
Dept: ALLERGY | Facility: CLINIC | Age: 34
End: 2024-05-20
Payer: COMMERCIAL

## 2024-05-20 DIAGNOSIS — J30.81 ALLERGIC RHINITIS DUE TO ANIMALS: Primary | ICD-10-CM

## 2024-05-20 DIAGNOSIS — J30.1 SEASONAL ALLERGIC RHINITIS DUE TO POLLEN: ICD-10-CM

## 2024-05-20 DIAGNOSIS — J30.89 ALLERGIC RHINITIS DUE TO MOLD: ICD-10-CM

## 2024-05-20 PROCEDURE — 95117 IMMUNOTHERAPY INJECTIONS: CPT

## 2024-05-20 NOTE — PROGRESS NOTES
Oswaldo Garcia presents to clinic today at the request of Ewa Greenfield MD (ordering provider) for Allergy Immunotherapy injection(s).       This service provided today was under the care of Ewa Greenfield MD; the supervising provider of the day; who was available if needed.      Patient presented after waiting 30 minutes with no reaction to  injections. Discharged from clinic.    Carrie Martin RN

## 2024-05-28 ENCOUNTER — ALLIED HEALTH/NURSE VISIT (OUTPATIENT)
Dept: ALLERGY | Facility: CLINIC | Age: 34
End: 2024-05-28
Payer: COMMERCIAL

## 2024-05-28 DIAGNOSIS — J30.1 SEASONAL ALLERGIC RHINITIS DUE TO POLLEN: ICD-10-CM

## 2024-05-28 DIAGNOSIS — J30.81 ALLERGIC RHINITIS DUE TO ANIMALS: Primary | ICD-10-CM

## 2024-05-28 DIAGNOSIS — J30.89 ALLERGIC RHINITIS DUE TO MOLD: ICD-10-CM

## 2024-05-28 DIAGNOSIS — J30.9 ALLERGIC RHINITIS: ICD-10-CM

## 2024-05-28 PROCEDURE — 95117 IMMUNOTHERAPY INJECTIONS: CPT

## 2024-05-28 NOTE — PROGRESS NOTES
Oswaldo Garcia presents to clinic today at the request of Ewa Greenfield MD (ordering provider) for Allergy Immunotherapy injection(s).       This service provided today was under the care of Ewa Greenfield MD; the supervising provider of the day; who was available if needed.      Patient presented after waiting 30 minutes with no reaction to  injections. Discharged from clinic.    SHYLA MacN, RN, PHN

## 2024-06-06 ENCOUNTER — OFFICE VISIT (OUTPATIENT)
Dept: ALLERGY | Facility: CLINIC | Age: 34
End: 2024-06-06
Attending: ALLERGY & IMMUNOLOGY
Payer: COMMERCIAL

## 2024-06-06 ENCOUNTER — ALLIED HEALTH/NURSE VISIT (OUTPATIENT)
Dept: ALLERGY | Facility: CLINIC | Age: 34
End: 2024-06-06
Payer: COMMERCIAL

## 2024-06-06 VITALS — HEIGHT: 78 IN | RESPIRATION RATE: 16 BRPM | BODY MASS INDEX: 30.08 KG/M2 | WEIGHT: 260 LBS

## 2024-06-06 DIAGNOSIS — J30.81 ALLERGIC RHINITIS DUE TO ANIMALS: ICD-10-CM

## 2024-06-06 DIAGNOSIS — J30.89 ALLERGIC RHINITIS DUE TO MOLD: ICD-10-CM

## 2024-06-06 DIAGNOSIS — J30.1 SEASONAL ALLERGIC RHINITIS DUE TO POLLEN: Primary | ICD-10-CM

## 2024-06-06 DIAGNOSIS — H10.13 ALLERGIC CONJUNCTIVITIS, BILATERAL: ICD-10-CM

## 2024-06-06 DIAGNOSIS — J30.1 SEASONAL ALLERGIC RHINITIS DUE TO POLLEN: ICD-10-CM

## 2024-06-06 PROCEDURE — 99213 OFFICE O/P EST LOW 20 MIN: CPT | Mod: 25 | Performed by: ALLERGY & IMMUNOLOGY

## 2024-06-06 PROCEDURE — 95117 IMMUNOTHERAPY INJECTIONS: CPT

## 2024-06-06 ASSESSMENT — PAIN SCALES - GENERAL: PAINLEVEL: NO PAIN (0)

## 2024-06-06 NOTE — PROGRESS NOTES
"Oswaldo Garcia was seen in the Allergy Clinic at [facility].      Oswaldo Garcia is a 33 year old Not  or  male who is seen today for a follow-up visit.    Started SCIT in 10/2023 and reached maintenance dose in 12/2023. No history of adverse reactions to treatment. Has noticed improvement in his symptoms - has no breakthrough symptoms when he takes cetirizine daily. Prior to starting immunotherapy he had symptoms regardless of whether or not he was taking medication. Still has \"allergy attacks\" including congestion, rhinorrhea, and sneezing if he skips a dose of cetirizine. Not taking any other medications for allergy symptoms including nasal sprays or eye drops.      Past Medical History:   Diagnosis Date    Acute appendicitis with peritoneal abscess 02/11/09    D/C 02/13/09    Fracture of ankle, trimalleolar, closed      Family History   Problem Relation Age of Onset    No Known Problems Mother     Diabetes Type 2  Father     No Known Problems Sister     No Known Problems Brother     Coronary Artery Disease Paternal Grandfather         MI age 65    Colon Cancer No family hx of     Prostate Cancer No family hx of      Social History     Tobacco Use    Smoking status: Former     Current packs/day: 0.00     Types: Cigarettes, Vaping Device     Quit date: 8/5/2013     Years since quitting: 10.8    Smokeless tobacco: Never   Vaping Use    Vaping status: Some Days    Substances: Nicotine, Flavoring   Substance Use Topics    Alcohol use: Yes     Comment: occ    Drug use: No     Social History     Social History Narrative    ** Merged History Encounter **            Past medical, family, and social history were reviewed.        Current Outpatient Medications:     cetirizine (ZYRTEC) 10 MG tablet, Take 10 mg by mouth daily, Disp: , Rfl:     EPINEPHrine (ANY BX GENERIC EQUIV) 0.3 MG/0.3ML injection 2-pack, Inject 0.3 mLs (0.3 mg) into the muscle as needed for anaphylaxis May repeat " "one time in 5-15 minutes if response to initial dose is inadequate., Disp: 2 each, Rfl: 2    ORDER FOR ALLERGEN IMMUNOTHERAPY, Name of Mix: Mix #1  Mold, Cat Cat Hair, Standardized A.P. 10,000 BAU/mL, HS  2.0 ml  Alternaria Tenuis 1:10 w/v, HS  0.5 ml Epicoccum Nigrum 1:10 w/v, HS 0.5 ml Diluent: HSA qs to 5ml, Disp: , Rfl:     ORDER FOR ALLERGEN IMMUNOTHERAPY, Name of Mix: Mix #2  Dog, Weeds Dog Hair-Dander, UF  1:650 w/v, HS  1.0 ml  Kochia 1:20 w/v, HS 0.5 ml Nettle 1:20 w/v, HS 0.5 ml Plantain, English 1:20 w/v, HS 0.5 ml Ragweed, Mix (Giant, Short) 1:20 w/v, HS 0.5 ml Russian Thistle 1:20 w/v, HS 0.5 ml Sagebrush, Mugwort 1:20 w/v, HS 0.5 ml Sorrel, Sheep 1:20 w/v, HS 0.5 ml Diluent: HSA qs to 5ml, Disp: , Rfl:     ORDER FOR ALLERGEN IMMUNOTHERAPY, Name of Mix: Mix #3  Grass, Tree  Portillo, White 1:20 w/v, HS  0.5 ml Birch Mix PRW 1:20 w/v, HS  0.5 ml Boxelder-Maple Mix BHR (Boxelder Hard Red) 1:20 w/v, HS  0.5 ml [place], Common 1:20 w/v, HS  0.5 ml Elm, American 1:20 w/v, HS  0.5 ml Oak Mix RVW 1:20 w/v, HS 0.5 ml Pine Mix 1:20 w/v, HS 0.5 ml [place] Tree, Black 1:20 w/v, HS 0.5 ml Jose Grass 1:20 w/v, HS 0.5 ml [place] Grass (Std) 100,000 BAU/mL, HS 0.4 ml Diluent: HSA qs to 5ml, Disp: , Rfl:     vitamin D2 (ERGOCALCIFEROL) 73703 units (1250 mcg) capsule, Take 1 capsule (50,000 Units) by mouth once a week For 8 weeks, Disp: 8 capsule, Rfl: 0    vitamin D3 (CHOLECALCIFEROL) 50 mcg (2000 units) tablet, Take 1 tablet (50 mcg) by mouth daily Start after finishing 21932 units weekly for 8 weeks, Disp: 90 tablet, Rfl: 3  No Known Allergies    EXAM:   Resp 16   Ht 2.007 m (6' 7\")   Wt 117.9 kg (260 lb)   BMI 29.29 kg/m    Physical Exam  Vitals and nursing note reviewed.   Constitutional:       Appearance: Normal appearance.   HENT:      Head: Normocephalic and atraumatic.      Right Ear: External ear normal.      Left Ear: External ear normal.      Nose: No mucosal edema, congestion or rhinorrhea.      " Mouth/Throat:      Mouth: Mucous membranes are moist. No oral lesions.      Pharynx: Oropharynx is clear. Uvula midline. No posterior oropharyngeal erythema.   Eyes:      General: Lids are normal.      Extraocular Movements: Extraocular movements intact.      Conjunctiva/sclera: Conjunctivae normal.   Neck:      Comments: No asymmetry, masses, or scars  Cardiovascular:      Rate and Rhythm: Normal rate and regular rhythm.      Heart sounds: S1 normal and S2 normal. No murmur heard.  Pulmonary:      Effort: Pulmonary effort is normal. No respiratory distress.      Breath sounds: Normal breath sounds and air entry.   Musculoskeletal:      Comments: No musculoskeletal defects noted   Skin:     General: Skin is warm and dry.      Findings: No lesion or rash.   Neurological:      General: No focal deficit present.      Mental Status: He is alert.   Psychiatric:         Mood and Affect: Mood and affect normal.           WORKUP:  None    ASSESSMENT/PLAN:  Oswaldo Garcia is a 33 year old male here for a follow-up visit.    1. Seasonal allergic rhinitis due to pollen - Completed approximately 6 months of allergen immunotherapy treatment at maintenance dose. No adverse reactions to treatment. Reports improvement in rhinoconjunctivitis symptoms and better response to antihistamine therapy. Counseled regarding the risks, including potentially fatal anaphylaxis, benefits, and recommended duration of treatment and he plans to continue at this time.    - continue allergen immunotherapy per protocol  - epinephrine auto-injector required per protocol  - take 10mg of cetirizine daily as needed  - Adult Allergy Clinic Follow-Up Order  - Adult Allergy Clinic Follow-Up Order; Future    2. Allergic rhinitis due to animals    - Adult Allergy Clinic Follow-Up Order  - Adult Allergy Clinic Follow-Up Order; Future    3. Allergic rhinitis due to mold    - Adult Allergy Clinic Follow-Up Order  - Adult Allergy Clinic Follow-Up Order;  Future    4. Allergic conjunctivitis, bilateral    - Adult Allergy Clinic Follow-Up Order  - Adult Allergy Clinic Follow-Up Order; Future      Follow-up in 1 year, sooner if needed      Thank you for allowing me to participate in the care of Oswaldo ANGELO Garcia.      Ewa Greenfield MD, FAAAAI  Allergy/Immunology  [facility]      Consent was obtained from the patient to use an AI documentation tool in the creation of this note.    Chart documentation done in part with Dragon Voice Recognition Software. Although reviewed after completion, some word and grammatical errors may remain.

## 2024-06-06 NOTE — LETTER
"6/6/2024      Oswaldo Garcia  [address] [facility] [address]      Dear Colleague,    Thank you for referring your patient, Oswaldo Garcia, to the [facility]. Please see a copy of my visit note below.    Oswaldo Garcia was seen in the Allergy Clinic at [facility].      Oswaldo Garcia is a 33 year old Not  or  male who is seen today for a follow-up visit.    Started SCIT in 10/2023 and reached maintenance dose in 12/2023. No history of adverse reactions to treatment. Has noticed improvement in his symptoms - has no breakthrough symptoms when he takes cetirizine daily. Prior to starting immunotherapy he had symptoms regardless of whether or not he was taking medication. Still has \"allergy attacks\" including congestion, rhinorrhea, and sneezing if he skips a dose of cetirizine. Not taking any other medications for allergy symptoms including nasal sprays or eye drops.      Past Medical History:   Diagnosis Date     Acute appendicitis with peritoneal abscess 02/11/09    D/C 02/13/09     Fracture of ankle, trimalleolar, closed      Family History   Problem Relation Age of Onset     No Known Problems Mother      Diabetes Type 2  Father      No Known Problems Sister      No Known Problems Brother      Coronary Artery Disease Paternal Grandfather         MI age 65     Colon Cancer No family hx of      Prostate Cancer No family hx of      Social History     Tobacco Use     Smoking status: Former     Current packs/day: 0.00     Types: Cigarettes, Vaping Device     Quit date: 8/5/2013     Years since quitting: 10.8     Smokeless tobacco: Never   Vaping Use     Vaping status: Some Days     Substances: Nicotine, Flavoring   Substance Use Topics     Alcohol use: Yes     Comment: occ     Drug use: No     Social History     Social History Narrative    ** Merged History Encounter **            Past medical, family, and social history were " "reviewed.        Current Outpatient Medications:      cetirizine (ZYRTEC) 10 MG tablet, Take 10 mg by mouth daily, Disp: , Rfl:      EPINEPHrine (ANY BX GENERIC EQUIV) 0.3 MG/0.3ML injection 2-pack, Inject 0.3 mLs (0.3 mg) into the muscle as needed for anaphylaxis May repeat one time in 5-15 minutes if response to initial dose is inadequate., Disp: 2 each, Rfl: 2     ORDER FOR ALLERGEN IMMUNOTHERAPY, Name of Mix: Mix #1  Mold, Cat Cat Hair, Standardized A.P. 10,000 BAU/mL, HS  2.0 ml  Alternaria Tenuis 1:10 w/v, HS  0.5 ml Epicoccum Nigrum 1:10 w/v, HS 0.5 ml Diluent: HSA qs to 5ml, Disp: , Rfl:      ORDER FOR ALLERGEN IMMUNOTHERAPY, Name of Mix: Mix #2  Dog, Weeds Dog Hair-Dander, UF  1:650 w/v, HS  1.0 ml  Kochia 1:20 w/v, HS 0.5 ml Nettle 1:20 w/v, HS 0.5 ml Plantain, English 1:20 w/v, HS 0.5 ml Ragweed, Mix (Giant, Short) 1:20 w/v, HS 0.5 ml Russian Thistle 1:20 w/v, HS 0.5 ml Sagebrush, Mugwort 1:20 w/v, HS 0.5 ml Sorrel, Sheep 1:20 w/v, HS 0.5 ml Diluent: HSA qs to 5ml, Disp: , Rfl:      ORDER FOR ALLERGEN IMMUNOTHERAPY, Name of Mix: Mix #3  Grass, Tree  Portillo, White 1:20 w/v, HS  0.5 ml Birch Mix PRW 1:20 w/v, HS  0.5 ml Boxelder-Maple Mix BHR (Boxelder Hard Red) 1:20 w/v, HS  0.5 ml Unadilla, Common 1:20 w/v, HS  0.5 ml Elm, American 1:20 w/v, HS  0.5 ml Oak Mix RVW 1:20 w/v, HS 0.5 ml Pine Mix 1:20 w/v, HS 0.5 ml Speedwell Tree, Black 1:20 w/v, HS 0.5 ml Jose Grass 1:20 w/v, HS 0.5 ml Harvey Grass (Std) 100,000 BAU/mL, HS 0.4 ml Diluent: HSA qs to 5ml, Disp: , Rfl:      vitamin D2 (ERGOCALCIFEROL) 38111 units (1250 mcg) capsule, Take 1 capsule (50,000 Units) by mouth once a week For 8 weeks, Disp: 8 capsule, Rfl: 0     vitamin D3 (CHOLECALCIFEROL) 50 mcg (2000 units) tablet, Take 1 tablet (50 mcg) by mouth daily Start after finishing 96025 units weekly for 8 weeks, Disp: 90 tablet, Rfl: 3  No Known Allergies    EXAM:   Resp 16   Ht 2.007 m (6' 7\")   Wt 117.9 kg (260 lb)   BMI 29.29 kg/m    Physical " Exam  Vitals and nursing note reviewed.   Constitutional:       Appearance: Normal appearance.   HENT:      Head: Normocephalic and atraumatic.      Right Ear: External ear normal.      Left Ear: External ear normal.      Nose: No mucosal edema, congestion or rhinorrhea.      Mouth/Throat:      Mouth: Mucous membranes are moist. No oral lesions.      Pharynx: Oropharynx is clear. Uvula midline. No posterior oropharyngeal erythema.   Eyes:      General: Lids are normal.      Extraocular Movements: Extraocular movements intact.      Conjunctiva/sclera: Conjunctivae normal.   Neck:      Comments: No asymmetry, masses, or scars  Cardiovascular:      Rate and Rhythm: Normal rate and regular rhythm.      Heart sounds: S1 normal and S2 normal. No murmur heard.  Pulmonary:      Effort: Pulmonary effort is normal. No respiratory distress.      Breath sounds: Normal breath sounds and air entry.   Musculoskeletal:      Comments: No musculoskeletal defects noted   Skin:     General: Skin is warm and dry.      Findings: No lesion or rash.   Neurological:      General: No focal deficit present.      Mental Status: He is alert.   Psychiatric:         Mood and Affect: Mood and affect normal.           WORKUP:  None    ASSESSMENT/PLAN:  Oswaldo Garcia is a 33 year old male here for a follow-up visit.    1. Seasonal allergic rhinitis due to pollen - Completed approximately 6 months of allergen immunotherapy treatment at maintenance dose. No adverse reactions to treatment. Reports improvement in rhinoconjunctivitis symptoms and better response to antihistamine therapy. Counseled regarding the risks, including potentially fatal anaphylaxis, benefits, and recommended duration of treatment and he plans to continue at this time.    - continue allergen immunotherapy per protocol  - epinephrine auto-injector required per protocol  - take 10mg of cetirizine daily as needed  - Adult Allergy Clinic Follow-Up Order  - Adult Allergy Clinic  Follow-Up Order; Future    2. Allergic rhinitis due to animals    - Adult Allergy Clinic Follow-Up Order  - Adult Allergy Clinic Follow-Up Order; Future    3. Allergic rhinitis due to mold    - Adult Allergy Clinic Follow-Up Order  - Adult Allergy Clinic Follow-Up Order; Future    4. Allergic conjunctivitis, bilateral    - Adult Allergy Clinic Follow-Up Order  - Adult Allergy Clinic Follow-Up Order; Future      Follow-up in 1 year, sooner if needed      Thank you for allowing me to participate in the care of Oswaldo Garcia.      Ewa Greenfield MD, FAAAAI  Allergy/Immunology  [facility]      Consent was obtained from the patient to use an AI documentation tool in the creation of this note.    Chart documentation done in part with Dragon Voice Recognition Software. Although reviewed after completion, some word and grammatical errors may remain.       Again, thank you for allowing me to participate in the care of your patient.        Sincerely,        Ewa Greenfield MD

## 2024-06-06 NOTE — PROGRESS NOTES
21-Jan-2018 12:36 Oswaldo Garcia presents to clinic today at the request of Ewa Greenfield MD (ordering provider) for Allergy Immunotherapy injection(s).       This service provided today was under the care of Ewa Greenfield MD; the supervising provider of the day; who was available if needed.      Patient presented after waiting 30 minutes with no reaction to  injections. Discharged from clinic.    Lindsey Lockett RN

## 2024-06-27 ENCOUNTER — ALLIED HEALTH/NURSE VISIT (OUTPATIENT)
Dept: ALLERGY | Facility: CLINIC | Age: 34
End: 2024-06-27
Payer: COMMERCIAL

## 2024-06-27 DIAGNOSIS — J30.89 ALLERGIC RHINITIS DUE TO MOLD: Primary | ICD-10-CM

## 2024-06-27 DIAGNOSIS — J30.81 ALLERGIC RHINITIS DUE TO ANIMAL HAIR AND DANDER: ICD-10-CM

## 2024-06-27 DIAGNOSIS — J30.1 SEASONAL ALLERGIC RHINITIS DUE TO POLLEN: ICD-10-CM

## 2024-06-27 PROCEDURE — 95117 IMMUNOTHERAPY INJECTIONS: CPT

## 2024-06-27 NOTE — PROGRESS NOTES
Oswaldo Garcia presents to clinic today at the request of Ewa Greenfield MD (ordering provider) for Allergy Immunotherapy injection(s).       This service provided today was under the care of Conor Gutierrez MD; the supervising provider of the day; who was available if needed.      Patient presented after waiting 30 minutes with no reaction to  injections. Discharged from clinic.    Lindsey Lockett RN

## 2024-07-23 ENCOUNTER — ALLIED HEALTH/NURSE VISIT (OUTPATIENT)
Dept: ALLERGY | Facility: CLINIC | Age: 34
End: 2024-07-23
Payer: COMMERCIAL

## 2024-07-23 DIAGNOSIS — J30.89 ALLERGIC RHINITIS DUE TO MOLD: Primary | ICD-10-CM

## 2024-07-23 DIAGNOSIS — J30.1 SEASONAL ALLERGIC RHINITIS DUE TO POLLEN: ICD-10-CM

## 2024-07-23 DIAGNOSIS — J30.81 ALLERGIC RHINITIS DUE TO ANIMAL HAIR AND DANDER: ICD-10-CM

## 2024-07-23 PROCEDURE — 95117 IMMUNOTHERAPY INJECTIONS: CPT

## 2024-08-26 ENCOUNTER — ALLIED HEALTH/NURSE VISIT (OUTPATIENT)
Dept: ALLERGY | Facility: CLINIC | Age: 34
End: 2024-08-26
Payer: COMMERCIAL

## 2024-08-26 DIAGNOSIS — J30.81 ALLERGIC RHINITIS DUE TO ANIMAL HAIR AND DANDER: ICD-10-CM

## 2024-08-26 DIAGNOSIS — J30.1 SEASONAL ALLERGIC RHINITIS DUE TO POLLEN: ICD-10-CM

## 2024-08-26 DIAGNOSIS — J30.89 ALLERGIC RHINITIS DUE TO MOLD: Primary | ICD-10-CM

## 2024-08-26 PROCEDURE — 95117 IMMUNOTHERAPY INJECTIONS: CPT

## 2024-08-26 NOTE — PROGRESS NOTES
Oswaldo Garcia presents to clinic today at the request of Ewa Greenfield MD (ordering provider) for Allergy Immunotherapy injection(s).       This service provided today was under the care of Ana Garg MD; the supervising provider of the day; who was available if needed.      Patient presented after waiting 30 minutes with no reaction to  injections. Discharged from clinic.    SHYLA MacN, RN, PHN

## 2024-09-24 ENCOUNTER — ALLIED HEALTH/NURSE VISIT (OUTPATIENT)
Dept: ALLERGY | Facility: CLINIC | Age: 34
End: 2024-09-24
Payer: COMMERCIAL

## 2024-09-24 DIAGNOSIS — J30.81 ALLERGIC RHINITIS DUE TO ANIMAL HAIR AND DANDER: ICD-10-CM

## 2024-09-24 DIAGNOSIS — J30.89 ALLERGIC RHINITIS DUE TO MOLD: Primary | ICD-10-CM

## 2024-09-24 DIAGNOSIS — J30.1 SEASONAL ALLERGIC RHINITIS DUE TO POLLEN: ICD-10-CM

## 2024-09-24 PROCEDURE — 95117 IMMUNOTHERAPY INJECTIONS: CPT

## 2024-10-22 ENCOUNTER — ALLIED HEALTH/NURSE VISIT (OUTPATIENT)
Dept: ALLERGY | Facility: CLINIC | Age: 34
End: 2024-10-22
Payer: COMMERCIAL

## 2024-10-22 DIAGNOSIS — J30.1 SEASONAL ALLERGIC RHINITIS DUE TO POLLEN: Primary | ICD-10-CM

## 2024-10-22 DIAGNOSIS — J30.81 ALLERGIC RHINITIS DUE TO ANIMAL HAIR AND DANDER: ICD-10-CM

## 2024-10-22 DIAGNOSIS — J30.89 ALLERGIC RHINITIS DUE TO MOLD: ICD-10-CM

## 2024-10-22 PROCEDURE — 95117 IMMUNOTHERAPY INJECTIONS: CPT

## 2024-11-20 ENCOUNTER — OFFICE VISIT (OUTPATIENT)
Dept: FAMILY MEDICINE | Facility: CLINIC | Age: 34
End: 2024-11-20
Payer: COMMERCIAL

## 2024-11-20 VITALS
BODY MASS INDEX: 29.18 KG/M2 | HEART RATE: 85 BPM | OXYGEN SATURATION: 98 % | DIASTOLIC BLOOD PRESSURE: 82 MMHG | RESPIRATION RATE: 20 BRPM | WEIGHT: 259 LBS | TEMPERATURE: 98 F | SYSTOLIC BLOOD PRESSURE: 122 MMHG

## 2024-11-20 DIAGNOSIS — F41.9 ANXIETY: ICD-10-CM

## 2024-11-20 DIAGNOSIS — R41.840 CONCENTRATION DEFICIT: Primary | ICD-10-CM

## 2024-11-20 PROCEDURE — 99213 OFFICE O/P EST LOW 20 MIN: CPT | Performed by: FAMILY MEDICINE

## 2024-11-20 PROCEDURE — 36415 COLL VENOUS BLD VENIPUNCTURE: CPT | Performed by: FAMILY MEDICINE

## 2024-11-20 ASSESSMENT — ANXIETY QUESTIONNAIRES
GAD7 TOTAL SCORE: 7
6. BECOMING EASILY ANNOYED OR IRRITABLE: SEVERAL DAYS
GAD7 TOTAL SCORE: 7
1. FEELING NERVOUS, ANXIOUS, OR ON EDGE: SEVERAL DAYS
3. WORRYING TOO MUCH ABOUT DIFFERENT THINGS: SEVERAL DAYS
5. BEING SO RESTLESS THAT IT IS HARD TO SIT STILL: NEARLY EVERY DAY
2. NOT BEING ABLE TO STOP OR CONTROL WORRYING: NOT AT ALL
7. FEELING AFRAID AS IF SOMETHING AWFUL MIGHT HAPPEN: NOT AT ALL

## 2024-11-20 ASSESSMENT — PATIENT HEALTH QUESTIONNAIRE - PHQ9: 5. POOR APPETITE OR OVEREATING: SEVERAL DAYS

## 2024-11-20 ASSESSMENT — PAIN SCALES - GENERAL: PAINLEVEL_OUTOF10: NO PAIN (0)

## 2024-11-20 NOTE — PROGRESS NOTES
Assessment & Plan     Concentration deficit  Referral ADD eval  - Adult Mental Health  Referral; Future    Anxiety  Stop High energy Drinks   - TSH with free T4 reflex; Future    Follow up with PCP after ADD eval     Souleymane Pollock is a 34 year old, presenting for the following health issues:  adhd consult        11/20/2024     4:16 PM   Additional Questions   Roomed by Crystal   Accompanied by self         11/20/2024     4:16 PM   Patient Reported Additional Medications   Patient reports taking the following new medications none     History of Present Illness       Reason for visit:  ADHD assesment He is missing 3 dose(s) of medications per week.  He is not taking prescribed medications regularly due to remembering to take.     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  Physical Exam   GENERAL: alert and no distress  PSYCH: mentation appears normal            Signed Electronically by: Kelly Moreno MD

## 2024-11-21 LAB — TSH SERPL DL<=0.005 MIU/L-ACNC: 1.74 UIU/ML (ref 0.3–4.2)

## 2024-11-25 ENCOUNTER — ALLIED HEALTH/NURSE VISIT (OUTPATIENT)
Dept: ALLERGY | Facility: CLINIC | Age: 34
End: 2024-11-25
Payer: COMMERCIAL

## 2024-11-25 DIAGNOSIS — J30.89 ALLERGIC RHINITIS DUE TO MOLD: ICD-10-CM

## 2024-11-25 DIAGNOSIS — J30.81 ALLERGIC RHINITIS DUE TO ANIMALS: ICD-10-CM

## 2024-11-25 DIAGNOSIS — H10.13 ALLERGIC CONJUNCTIVITIS, BILATERAL: ICD-10-CM

## 2024-11-25 DIAGNOSIS — J30.1 SEASONAL ALLERGIC RHINITIS DUE TO POLLEN: ICD-10-CM

## 2024-11-25 DIAGNOSIS — J30.81 ALLERGIC RHINITIS DUE TO ANIMALS: Primary | ICD-10-CM

## 2024-11-25 DIAGNOSIS — J30.1 SEASONAL ALLERGIC RHINITIS DUE TO POLLEN: Primary | ICD-10-CM

## 2024-11-25 DIAGNOSIS — J30.81 ALLERGIC RHINITIS DUE TO ANIMAL HAIR AND DANDER: ICD-10-CM

## 2024-11-25 PROCEDURE — 95165 ANTIGEN THERAPY SERVICES: CPT | Performed by: ALLERGY & IMMUNOLOGY

## 2024-11-25 PROCEDURE — 95117 IMMUNOTHERAPY INJECTIONS: CPT

## 2024-11-25 NOTE — PROGRESS NOTES
Oswaldo Garcia presents to clinic today at the request of Ewa Greenfield MD (ordering provider) for Allergy Immunotherapy injection(s).       This service provided today was under the care of Ewa Greenfield MD; the supervising provider of the day; who was available if needed.      Patient presented after waiting 30 minutes with no reaction to  injections. Discharged from clinic.    Lindsey Lockett RN

## 2024-11-25 NOTE — PROGRESS NOTES
Allergy serums billed to [facility].     Vials billed below:    Vial Color Content                      Vial Size Expiration Date  Red 1:1 Grass, Trees 5mL  11/25/25  Red 1:1 Dog, Weeds 5mL  11/25/25  Red 1:1 Cat, Molds 5mL  11/25/25      Billed 30 units    Checked by Leo Betancourt / LPN        Signature  Leo Betancourt LPN

## 2024-11-25 NOTE — TELEPHONE ENCOUNTER
ALLERGY SOLUTION RE-ORDER REQUEST    Oswaldo Garcia 1990 MRN: [id number]    DATE NEEDED:  3 weeks  Vial Color Content    Vial Size  Red 1:1 Grass, Trees    5 mL  Red 1:1 Dog, Weeds   5 mL  Red 1:1 Cat, Molds    5 mL        Serum reorder consent signed and patient/parent was advised that new serums would be ordered through the pharmacy and billed to their insurance company when they arrive in clinic. Yes    Shot Clinic Location:  [facility].  Ship to Location: [facility].  Serum billed to:  [facility].    Special Instructions:  NA        Requester Signature  Nolvia RANDOLPH MA

## 2024-11-27 NOTE — TELEPHONE ENCOUNTER
Allergy serums received at [facility].    Vials received below:    Vial Color Content                      Vial Size Expiration Date  Red 1:1 Dog, Weeds 5mL  11/25/2025  Red 1:1 Grass, Trees 5mL  11/25/2025  Red 1:1 Cat, Molds 5mL  11/25/2025    Signature  SHYLA MacN, RN, PHN

## 2024-12-23 ENCOUNTER — ALLIED HEALTH/NURSE VISIT (OUTPATIENT)
Dept: ALLERGY | Facility: CLINIC | Age: 34
End: 2024-12-23
Payer: COMMERCIAL

## 2024-12-23 DIAGNOSIS — J30.81 ALLERGIC RHINITIS DUE TO ANIMAL HAIR AND DANDER: ICD-10-CM

## 2024-12-23 DIAGNOSIS — J30.89 ALLERGIC RHINITIS DUE TO MOLD: ICD-10-CM

## 2024-12-23 DIAGNOSIS — J30.1 SEASONAL ALLERGIC RHINITIS DUE TO POLLEN: Primary | ICD-10-CM

## 2024-12-23 PROCEDURE — 95117 IMMUNOTHERAPY INJECTIONS: CPT

## 2025-01-23 ENCOUNTER — VIRTUAL VISIT (OUTPATIENT)
Dept: FAMILY MEDICINE | Facility: CLINIC | Age: 35
End: 2025-01-23
Payer: COMMERCIAL

## 2025-01-23 DIAGNOSIS — F17.290 VAPING NICOTINE DEPENDENCE, TOBACCO PRODUCT: Primary | ICD-10-CM

## 2025-01-23 DIAGNOSIS — E55.9 VITAMIN D DEFICIENCY: ICD-10-CM

## 2025-01-23 RX ORDER — VARENICLINE TARTRATE 1 MG/1
1 TABLET, FILM COATED ORAL 2 TIMES DAILY
Qty: 180 TABLET | Refills: 2 | Status: SHIPPED | OUTPATIENT
Start: 2025-01-23

## 2025-01-23 RX ORDER — CHOLECALCIFEROL (VITAMIN D3) 50 MCG
1 TABLET ORAL DAILY
Qty: 90 TABLET | Refills: 3 | Status: SHIPPED | OUTPATIENT
Start: 2025-01-23

## 2025-01-23 RX ORDER — VARENICLINE TARTRATE 0.5 (11)-1
KIT ORAL
Qty: 53 TABLET | Refills: 0 | Status: SHIPPED | OUTPATIENT
Start: 2025-01-23

## 2025-01-23 NOTE — PROGRESS NOTES
"  If patient has telephone visit, have they been educated on video visit as preferred visit method and offered to change to video visit? N/A        Instructions Relayed to Patient by Virtual Roomer:     Patient is active on [employer]:   Relayed following to patient: \"It looks like you are active on [employer], are you able to join the visit this way? If not, do you need us to send you a link now or would you like your provider to send a link via text or email when they are ready to initiate the visit?\"      Patient Confirmed they will join visit via: [employer]  Reminded patient to ensure they were logged on to virtual visit by arrival time listed.   Asked if patient has flexibility to initiate visit sooner than arrival time: patient is unable to initiate visit earlier than arrival time     If pediatric virtual visit, ensured pediatric patient along with parent/guardian will be present for video visit.     Patient offered the website [email or web address] and/or phone number to [employer] Help line: [phone]    Phill is a 34 year old who is being evaluated via a billable video visit.    How would you like to obtain your AVS? [employer]  If the video visit is dropped, the invitation should be resent by: Text to cell phone: [phone]  Will anyone else be joining your video visit? No      Assessment & Plan     Vaping nicotine dependence, tobacco product  Has used chantix in past for quitting smoking successfully-but started vaping with stress  Motivated to quit.   Only side effect in past bad dreams  Discussed black box warnings   Follow up with us as needed and in April for annual exam   - varenicline (CHANTIX TIARA) 0.5 MG X 11 & 1 MG X 42 tablet  Dispense: 53 tablet; Refill: 0  - varenicline (CHANTIX) 1 MG tablet  Dispense: 180 tablet; Refill: 2    Vitamin D deficiency  Refilled.  Will check vitamin D at physical   - vitamin D3 (CHOLECALCIFEROL) 50 mcg (2000 units) tablet  Dispense: 90 tablet; Refill: " "3            Nicotine/Tobacco Cessation  He reports that he has been smoking vaping device. He has never used smokeless tobacco.  Nicotine/Tobacco Cessation Plan  Pharmacotherapies : varenicline (Chantix)      BMI  Estimated body mass index is 29.18 kg/m  as calculated from the following:    Height as of 6/6/24: 2.007 m (6' 7\").    Weight as of 11/20/24: 117.5 kg (259 lb).   Weight management plan: Discussed healthy diet and exercise guidelines      Patient Instructions   Set a quit date  Start chantix 2 weeks before quit date  Return urgently if any change in symptoms.    Let us know if you develop side effects or other change in symptoms.  Leaders at [facility] for Safe Medication Practices (IS) issued a warning today (5/21) to the FDA and to physicians urging caution in prescribing or maintaining patients on the smoking-cessation drug Chantix (R) (varenicline) because of recent reports of serious harm. Examples include sudden loss of consciousness, seizures, muscle spasms, vision disturbances, hallucinations, paranoia and psychosis. [facility] is concerned particularly about those using Chantix who operate aircraft, trains, buses and other vehicles, or who work in other settings where a lapse in alertness or motor control could lead to massive, serious injury. See details at this link: http://www.is.org/docs/varenicline_study.asp     Souleymane Pollock is a 34 year old, presenting for the following health issues:  Medication Request (Chantix)      1/23/2025     2:37 PM   Additional Questions   Roomed by Paz SILVERMAN   Accompanied by Self         1/23/2025     2:37 PM   Patient Reported Additional Medications   Patient reports taking the following new medications None     History of Present Illness       Reason for visit:  Chantix for quitting smoking   He is taking medications regularly.     Patient known to me with history of allergies for which he receives immunotherapy and vitamin D deficiency " requesting prescription for chantix.  History of smoking- Used before a couple yrs ago to help quit smoking and No bad side effects except bad dreams.  No nausea or vomiting. No depression or suicidal ideation.  Did help quit and able to quit for about a year.  Last year on and off vaping and able to found able to pull it  out easily and increasing use.   Vaping now.  Regulary thoughout the day.  Couple puffs an hour  Every once in awhile cough and chest pain and is kind freaking me out and wants to quit   Wife is on board  Both did chantix to quit smoking and she was able to remain smoke free. Started vaping due to stress   Dog had bad leg amputated and up with him giving pain medications etc and very stressful time.  Hoping to quit vaping and not start a different bad habit.  Discontinued vitamin D after prescription ran out and has not restarted. Wasn't aware needed continue.  No other concerns for me.    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      Objective           Vitals:  No vitals were obtained today due to virtual visit.    Physical Exam   GENERAL: alert and no distress  EYES: Eyes grossly normal to inspection.  No discharge or erythema, or obvious scleral/conjunctival abnormalities.  RESP: No audible wheeze, cough, or visible cyanosis.    SKIN: Visible skin clear. No significant rash, abnormal pigmentation or lesions.  NEURO: Cranial nerves grossly intact.  Mentation and speech appropriate for age.  PSYCH: Appropriate affect, tone, and pace of words          Video-Visit Details    Type of service:  Video Visit   Originating Location (pt. Location): Other work     Distant Location (provider location):  Off-site  Platform used for Video Visit: Dhara  Signed Electronically by: Jacquie Flores PA-C

## 2025-01-23 NOTE — PATIENT INSTRUCTIONS
Set a quit date  Start chantix 2 weeks before quit date  Return urgently if any change in symptoms.    Let us know if you develop side effects or other change in symptoms.  Leaders at [facility] for Safe Medication Practices ([facility]) issued a warning today (5/21) to the FDA and to physicians urging caution in prescribing or maintaining patients on the smoking-cessation drug Chantix (R) (varenicline) because of recent reports of serious harm. Examples include sudden loss of consciousness, seizures, muscle spasms, vision disturbances, hallucinations, paranoia and psychosis. [facility] is concerned particularly about those using Chantix who operate aircraft, trains, buses and other vehicles, or who work in other settings where a lapse in alertness or motor control could lead to massive, serious injury. See details at this link: http://www.is.org/docs/varenicline_study.asp

## 2025-01-27 ENCOUNTER — ALLIED HEALTH/NURSE VISIT (OUTPATIENT)
Dept: ALLERGY | Facility: CLINIC | Age: 35
End: 2025-01-27
Payer: COMMERCIAL

## 2025-01-27 DIAGNOSIS — J30.81 ALLERGIC RHINITIS DUE TO ANIMAL HAIR AND DANDER: ICD-10-CM

## 2025-01-27 DIAGNOSIS — J30.1 SEASONAL ALLERGIC RHINITIS DUE TO POLLEN: Primary | ICD-10-CM

## 2025-01-27 DIAGNOSIS — J30.89 ALLERGIC RHINITIS DUE TO MOLD: ICD-10-CM

## 2025-01-27 PROCEDURE — 95117 IMMUNOTHERAPY INJECTIONS: CPT

## 2025-02-24 ENCOUNTER — ALLIED HEALTH/NURSE VISIT (OUTPATIENT)
Dept: ALLERGY | Facility: CLINIC | Age: 35
End: 2025-02-24
Payer: COMMERCIAL

## 2025-02-24 DIAGNOSIS — J30.89 ALLERGIC RHINITIS DUE TO MOLD: ICD-10-CM

## 2025-02-24 DIAGNOSIS — J30.1 SEASONAL ALLERGIC RHINITIS DUE TO POLLEN: Primary | ICD-10-CM

## 2025-02-24 DIAGNOSIS — J30.81 ALLERGIC RHINITIS DUE TO ANIMAL HAIR AND DANDER: ICD-10-CM

## 2025-02-24 PROCEDURE — 95117 IMMUNOTHERAPY INJECTIONS: CPT

## 2025-03-11 ENCOUNTER — OFFICE VISIT (OUTPATIENT)
Dept: PODIATRY | Facility: CLINIC | Age: 35
End: 2025-03-11
Payer: COMMERCIAL

## 2025-03-11 DIAGNOSIS — B35.1 ONYCHOMYCOSIS: Primary | ICD-10-CM

## 2025-03-11 PROCEDURE — 99204 OFFICE O/P NEW MOD 45 MIN: CPT | Performed by: PODIATRIST

## 2025-03-11 RX ORDER — CICLOPIROX 80 MG/ML
SOLUTION TOPICAL
Qty: 6.6 ML | Refills: 11 | Status: SHIPPED | OUTPATIENT
Start: 2025-03-11

## 2025-03-11 NOTE — LETTER
3/11/2025      Oswaldo Garcia  [address]  [facility] [address]      Dear Colleague,    Thank you for referring your patient, Oswaldo Garcia, to the [facility]. Please see a copy of my visit note below.    Past Medical History:   Diagnosis Date    Acute appendicitis with peritoneal abscess 02/11/09    D/C 02/13/09    Fracture of ankle, trimalleolar, closed      Patient Active Problem List   Diagnosis    Chronic appendicitis    CARDIOVASCULAR SCREENING; LDL GOAL LESS THAN 160    Closed trimalleolar fracture 8/4/13    Seasonal allergic rhinitis due to pollen    Allergic rhinitis due to mold    Allergic rhinitis due to animals    Allergic conjunctivitis, bilateral     Past Surgical History:   Procedure Laterality Date    APPENDECTOMY  3/10/2009    COLONOSCOPY  11/06/09    OPEN REDUCTION INTERNAL FIXATION ANKLE  8/16/2013    Procedure: OPEN REDUCTION INTERNAL FIXATION ANKLE;  Open Reduction Internal Fixation Right Ankle;  Surgeon: George De La Rosa DPM;  Location:  OR     Social History     Socioeconomic History    Marital status:      Spouse name: Not on file    Number of children: Not on file    Years of education: Not on file    Highest education level: Not on file   Occupational History    Not on file   Tobacco Use    Smoking status: Every Day     Types: Vaping Device    Smokeless tobacco: Never   Vaping Use    Vaping status: Every Day    Substances: Nicotine, Flavoring   Substance and Sexual Activity    Alcohol use: Yes     Comment: occ    Drug use: No    Sexual activity: Yes     Partners: Female     Birth control/protection: None   Other Topics Concern    Parent/sibling w/ CABG, MI or angioplasty before 65F 55M? Not Asked   Social History Narrative    ** Merged History Encounter **          Social Drivers of Health     Financial Resource Strain: Low Risk  (4/17/2024)    Financial Resource Strain     Within the past 12 months, have you or your family members you  live with been unable to get utilities (heat, electricity) when it was really needed?: No   Food Insecurity: Low Risk  (4/17/2024)    Food Insecurity     Within the past 12 months, did you worry that your food would run out before you got money to buy more?: No     Within the past 12 months, did the food you bought just not last and you didn t have money to get more?: No   Transportation Needs: Low Risk  (4/17/2024)    Transportation Needs     Within the past 12 months, has lack of transportation kept you from medical appointments, getting your medicines, non-medical meetings or appointments, work, or from getting things that you need?: No   Physical Activity: Sufficiently Active (4/17/2024)    Exercise Vital Sign     Days of Exercise per Week: 4 days     Minutes of Exercise per Session: 150+ min   Stress: Stress Concern Present (4/17/2024)    [employer] - Occupational Stress Questionnaire     Feeling of Stress : To some extent   Social Connections: Unknown (4/17/2024)    Social Connection and Isolation Panel [NHANES]     Frequency of Communication with Friends and Family: Not on file     Frequency of Social Gatherings with Friends and Family: Once a week     Attends Faith Services: Not on file     Active Member of Clubs or Organizations: Not on file     Attends Club or Organization Meetings: Not on file     Marital Status: Not on file   Interpersonal Safety: Low Risk  (11/20/2024)    Interpersonal Safety     Do you feel physically and emotionally safe where you currently live?: Yes     Within the past 12 months, have you been hit, slapped, kicked or otherwise physically hurt by someone?: No     Within the past 12 months, have you been humiliated or emotionally abused in other ways by your partner or ex-partner?: No   Housing Stability: Low Risk  (4/17/2024)    Housing Stability     Do you have housing? : Yes     Are you worried about losing your housing?: No     Family History    Problem Relation Age of Onset    No Known Problems Mother     Diabetes Type 2  Father     No Known Problems Sister     No Known Problems Brother     Coronary Artery Disease Paternal Grandfather         MI age 65    Colon Cancer No family hx of     Prostate Cancer No family hx of                Subjective findings- 34-year-old presents for right big toenail fungus.  Relates it has been present for about 1 year duration, relates to no pain, feels its worsened, has tried multiple over-the-counter topical medications that have not helped, relates to no specific injuries.    Objective findings- DP and PT are 2 out of 4 right.  Has right medial hallux nail border with thickening dystrophy discoloration brittleness and subungual debris.  There is no erythema, no edema, no drainage, no odor, no calor, no pain on palpation.    Assessment and plan- Onychomycosis right Hallux nail.  Diagnosis and treatment options discussed with the patient.  Prescription for Penlac given and use discussed with the patient.  Lamisil oral medication use discussed with the patient, he relates he wants to hold on this today.  Return to clinic and see me in 6 months.                    Low level of medical decision making.      Again, thank you for allowing me to participate in the care of your patient.        Sincerely,        Oswaldo Oliveros DPM    Electronically signed

## 2025-03-11 NOTE — PROGRESS NOTES
Past Medical History:   Diagnosis Date    Acute appendicitis with peritoneal abscess 02/11/09    D/C 02/13/09    Fracture of ankle, trimalleolar, closed      Patient Active Problem List   Diagnosis    Chronic appendicitis    CARDIOVASCULAR SCREENING; LDL GOAL LESS THAN 160    Closed trimalleolar fracture 8/4/13    Seasonal allergic rhinitis due to pollen    Allergic rhinitis due to mold    Allergic rhinitis due to animals    Allergic conjunctivitis, bilateral     Past Surgical History:   Procedure Laterality Date    APPENDECTOMY  3/10/2009    COLONOSCOPY  11/06/09    OPEN REDUCTION INTERNAL FIXATION ANKLE  8/16/2013    Procedure: OPEN REDUCTION INTERNAL FIXATION ANKLE;  Open Reduction Internal Fixation Right Ankle;  Surgeon: George De La Rosa DPM;  Location:  OR     Social History     Socioeconomic History    Marital status:      Spouse name: Not on file    Number of children: Not on file    Years of education: Not on file    Highest education level: Not on file   Occupational History    Not on file   Tobacco Use    Smoking status: Every Day     Types: Vaping Device    Smokeless tobacco: Never   Vaping Use    Vaping status: Every Day    Substances: Nicotine, Flavoring   Substance and Sexual Activity    Alcohol use: Yes     Comment: occ    Drug use: No    Sexual activity: Yes     Partners: Female     Birth control/protection: None   Other Topics Concern    Parent/sibling w/ CABG, MI or angioplasty before 65F 55M? Not Asked   Social History Narrative    ** Merged History Encounter **          Social Drivers of Health     Financial Resource Strain: Low Risk  (4/17/2024)    Financial Resource Strain     Within the past 12 months, have you or your family members you live with been unable to get utilities (heat, electricity) when it was really needed?: No   Food Insecurity: Low Risk  (4/17/2024)    Food Insecurity     Within the past 12 months, did you worry that your food would run out before you got money to  buy more?: No     Within the past 12 months, did the food you bought just not last and you didn t have money to get more?: No   Transportation Needs: Low Risk  (4/17/2024)    Transportation Needs     Within the past 12 months, has lack of transportation kept you from medical appointments, getting your medicines, non-medical meetings or appointments, work, or from getting things that you need?: No   Physical Activity: Sufficiently Active (4/17/2024)    Exercise Vital Sign     Days of Exercise per Week: 4 days     Minutes of Exercise per Session: 150+ min   Stress: Stress Concern Present (4/17/2024)    [employer] - Occupational Stress Questionnaire     Feeling of Stress : To some extent   Social Connections: Unknown (4/17/2024)    Social Connection and Isolation Panel [NHANES]     Frequency of Communication with Friends and Family: Not on file     Frequency of Social Gatherings with Friends and Family: Once a week     Attends Rastafari Services: Not on file     Active Member of Clubs or Organizations: Not on file     Attends Club or Organization Meetings: Not on file     Marital Status: Not on file   Interpersonal Safety: Low Risk  (11/20/2024)    Interpersonal Safety     Do you feel physically and emotionally safe where you currently live?: Yes     Within the past 12 months, have you been hit, slapped, kicked or otherwise physically hurt by someone?: No     Within the past 12 months, have you been humiliated or emotionally abused in other ways by your partner or ex-partner?: No   Housing Stability: Low Risk  (4/17/2024)    Housing Stability     Do you have housing? : Yes     Are you worried about losing your housing?: No     Family History   Problem Relation Age of Onset    No Known Problems Mother     Diabetes Type 2  Father     No Known Problems Sister     No Known Problems Brother     Coronary Artery Disease Paternal Grandfather         MI age 65    Colon Cancer No family hx of      Prostate Cancer No family hx of                Subjective findings- 34-year-old presents for right big toenail fungus.  Relates it has been present for about 1 year duration, relates to no pain, feels its worsened, has tried multiple over-the-counter topical medications that have not helped, relates to no specific injuries.    Objective findings- DP and PT are 2 out of 4 right.  Has right medial hallux nail border with thickening dystrophy discoloration brittleness and subungual debris.  There is no erythema, no edema, no drainage, no odor, no calor, no pain on palpation.    Assessment and plan- Onychomycosis right Hallux nail.  Diagnosis and treatment options discussed with the patient.  Prescription for Penlac given and use discussed with the patient.  Lamisil oral medication use discussed with the patient, he relates he wants to hold on this today.  Return to clinic and see me in 6 months.                                  Low level of medical decision making.

## 2025-03-11 NOTE — NURSING NOTE
Oswaldo Garcia's chief complaint for this visit includes:  Chief Complaint   Patient presents with    Consult     Toe nail fungus, right big toe     PCP: Jacquie Flores    Referring Provider:  Referred Self, MD  No address on file    There were no vitals taken for this visit.  Data Unavailable     Do you need any medication refills at today's visit? NO    No Known Allergies    Rebecca Doss LPN

## 2025-03-14 ENCOUNTER — OFFICE VISIT (OUTPATIENT)
Dept: FAMILY MEDICINE | Facility: CLINIC | Age: 35
End: 2025-03-14
Payer: COMMERCIAL

## 2025-03-14 VITALS
HEIGHT: 78 IN | DIASTOLIC BLOOD PRESSURE: 80 MMHG | WEIGHT: 271 LBS | BODY MASS INDEX: 31.35 KG/M2 | HEART RATE: 96 BPM | OXYGEN SATURATION: 97 % | SYSTOLIC BLOOD PRESSURE: 120 MMHG | TEMPERATURE: 97.4 F | RESPIRATION RATE: 11 BRPM

## 2025-03-14 DIAGNOSIS — Z13.1 SCREENING FOR DIABETES MELLITUS: ICD-10-CM

## 2025-03-14 DIAGNOSIS — F17.290 VAPING NICOTINE DEPENDENCE, TOBACCO PRODUCT: ICD-10-CM

## 2025-03-14 DIAGNOSIS — Z13.220 SCREENING FOR HYPERLIPIDEMIA: ICD-10-CM

## 2025-03-14 DIAGNOSIS — Z00.00 ROUTINE GENERAL MEDICAL EXAMINATION AT A HEALTH CARE FACILITY: Primary | ICD-10-CM

## 2025-03-14 DIAGNOSIS — R63.5 WEIGHT GAIN: ICD-10-CM

## 2025-03-14 DIAGNOSIS — E55.9 VITAMIN D DEFICIENCY: ICD-10-CM

## 2025-03-14 PROCEDURE — 3074F SYST BP LT 130 MM HG: CPT | Performed by: PHYSICIAN ASSISTANT

## 2025-03-14 PROCEDURE — 3079F DIAST BP 80-89 MM HG: CPT | Performed by: PHYSICIAN ASSISTANT

## 2025-03-14 PROCEDURE — 99395 PREV VISIT EST AGE 18-39: CPT | Performed by: PHYSICIAN ASSISTANT

## 2025-03-14 RX ORDER — VARENICLINE TARTRATE 1 MG/1
1 TABLET, FILM COATED ORAL 2 TIMES DAILY
Qty: 180 TABLET | Refills: 2 | Status: SHIPPED | OUTPATIENT
Start: 2025-03-14

## 2025-03-14 RX ORDER — CHOLECALCIFEROL (VITAMIN D3) 50 MCG
1 TABLET ORAL DAILY
Qty: 90 TABLET | Refills: 3 | Status: SHIPPED | OUTPATIENT
Start: 2025-03-14

## 2025-03-14 NOTE — PROGRESS NOTES
Preventive Care Visit  [facility]  Jacquie Flores PA-C, Family Medicine  Mar 14, 2025      Assessment & Plan     Routine general medical examination at a health care facility  Benign exam except obesity.   Up to date on vaccines.         Vaping nicotine dependence, tobacco product  Chantix helpful- continue   - varenicline (CHANTIX) 1 MG tablet  Dispense: 180 tablet; Refill: 2    Weight gain  Rule out hypothyroidism   - TSH with free T4 reflex    Vitamin D deficiency  Has not been taking consistently   - vitamin D3 (CHOLECALCIFEROL) 50 mcg (2000 units) tablet  Dispense: 90 tablet; Refill: 3  - 25 Hydroxyvitamin D2 and D3    Screening for diabetes mellitus  Prefers to return for fasting labs   - Comprehensive metabolic panel  - Hemoglobin A1c    Screening for hyperlipidemia   last year - prefers to return for fasting labs   - Lipid panel reflex to direct LDL Fasting              Please schedule a fasting lab appointment (nothing to eat or drink 9 hours prior except water and/or your medications) at your earliest convenience.        I will notify you of lab results via [facility].    Continue chantix as needed    Souleymane Pollock is a 34 year old, presenting for the following:  Physical        3/14/2025     3:25 PM   Additional Questions   Roomed by Lindsey ROSS          History of Present Illness       Reason for visit:  Annual physical   He is taking medications regularly.    Patient known to male with history of vaping, allergies, and vitamin D deficiency presents for annual exam.   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 Has vivid dreams but no other side effects and find med helpful.  Has had some unintentional weight gain which he attributes to less active in winter months- 11 pounds   Wt Readings from Last 4 Encounters:   03/14/25 122.9 kg (271 lb)    11/20/24 117.5 kg (259 lb)   06/06/24 117.9 kg (260 lb)   04/19/24 118 kg (260 lb 1.6 oz)             Advance Care Planning  Patient does not have a Health Care Directive: Discussed advance care planning with patient; information given to patient to review.      4/17/2024   General Health   How would you rate your overall physical health? Good   Feel stress (tense, anxious, or unable to sleep) To some extent         4/17/2024   Nutrition   Three or more servings of calcium each day? Yes   Diet: Regular (no restrictions)   How many servings of fruit and vegetables per day? (!) 2-3   How many sweetened beverages each day? (!) 2         4/17/2024   Exercise   Days per week of moderate/strenous exercise 4 days   Average minutes spent exercising at this level 150+ min         4/17/2024   Social Factors   Frequency of gathering with friends or relatives Once a week   Worry food won't last until get money to buy more No   Food not last or not have enough money for food? No   Do you have housing? (Housing is defined as stable permanent housing and does not include staying ouside in a car, in a tent, in an abandoned building, in an overnight shelter, or couch-surfing.) Yes   Are you worried about losing your housing? No   Lack of transportation? No   Unable to get utilities (heat,electricity)? No         4/17/2024   Dental   Dentist two times every year? Yes           4/17/2024   TB Screening   Were you born outside of the [place]? No           Today's PHQ-2 Score:       3/13/2025     9:54 AM   PHQ-2 ( 1999 [employer])   Q1: Little interest or pleasure in doing things 1   Q2: Feeling down, depressed or hopeless 1   PHQ-2 Score 2    Q1: Little interest or pleasure in doing things Several days   Q2: Feeling down, depressed or hopeless Several days   PHQ-2 Score 2       Patient-reported           4/17/2024   Substance Use   Alcohol more than 3/day or more than 7/wk No   Do you use any other substances recreationally? No     Social  History     Tobacco Use    Smoking status: Former     Types: Cigarettes    Smokeless tobacco: Never   Vaping Use    Vaping status: Every Day    Substances: Nicotine, Flavoring    Devices: [employer] tank   Substance Use Topics    Alcohol use: Yes     Comment: occ    Drug use: No             4/17/2024   Contraception/Family Planning   Questions about contraception or family planning No        Reviewed and updated as needed this visit by Provider   Tobacco   Meds  Problems  Med Hx  Surg Hx  Fam Hx  Soc Hx Sexual   Activity          BP Readings from Last 3 Encounters:   03/14/25 120/80   11/20/24 122/82   04/19/24 126/83    Wt Readings from Last 3 Encounters:   03/14/25 122.9 kg (271 lb)   11/20/24 117.5 kg (259 lb)   06/06/24 117.9 kg (260 lb)                  Patient Active Problem List   Diagnosis    Chronic appendicitis    CARDIOVASCULAR SCREENING; LDL GOAL LESS THAN 160    Closed trimalleolar fracture 8/4/13    Seasonal allergic rhinitis due to pollen    Allergic rhinitis due to mold    Allergic rhinitis due to animals    Allergic conjunctivitis, bilateral     Past Surgical History:   Procedure Laterality Date    APPENDECTOMY  3/10/2009    COLONOSCOPY  11/06/09    OPEN REDUCTION INTERNAL FIXATION ANKLE  8/16/2013    Procedure: OPEN REDUCTION INTERNAL FIXATION ANKLE;  Open Reduction Internal Fixation Right Ankle;  Surgeon: George De La Rosa DPM;  Location:  OR       Social History     Tobacco Use    Smoking status: Former     Types: Cigarettes    Smokeless tobacco: Never   Substance Use Topics    Alcohol use: Yes     Comment: occ     Family History   Problem Relation Age of Onset    No Known Problems Mother     Diabetes Type 2  Father     No Known Problems Sister     No Known Problems Brother     Coronary Artery Disease Paternal Grandfather         MI age 65    Colon Cancer No family hx of     Prostate Cancer No family hx of          Current Outpatient Medications   Medication Sig Dispense Refill     varenicline (CHANTIX) 1 MG tablet Take 1 tablet (1 mg) by mouth 2 times daily. After finishing starter pack 180 tablet 2    vitamin D3 (CHOLECALCIFEROL) 50 mcg (2000 units) tablet Take 1 tablet (50 mcg) by mouth daily. 90 tablet 3    cetirizine (ZYRTEC) 10 MG tablet Take 10 mg by mouth daily      ciclopirox (PENLAC) 8 % external solution Apply to adjacent skin and affected nails daily.  Remove with alcohol every 7 days, then repeat. 6.6 mL 11    EPINEPHrine (ANY BX GENERIC EQUIV) 0.3 MG/0.3ML injection 2-pack Inject 0.3 mLs (0.3 mg) into the muscle as needed for anaphylaxis May repeat one time in 5-15 minutes if response to initial dose is inadequate. 2 each 2    ORDER FOR ALLERGEN IMMUNOTHERAPY Name of Mix: Mix #1  Mold, Cat  Cat Hair, Standardized A.P. 10,000 BAU/mL, HS  2.0 ml   Alternaria Tenuis 1:10 w/v, HS  0.5 ml  Epicoccum Nigrum 1:10 w/v, HS 0.5 ml  Diluent: HSA qs to 5ml      ORDER FOR ALLERGEN IMMUNOTHERAPY Name of Mix: Mix #2  Dog, Weeds  Dog Hair-Dander, UF  1:650 w/v, HS  1.0 ml   Kochia 1:20 w/v, HS 0.5 ml  Nettle 1:20 w/v, HS 0.5 ml  Plantain, English 1:20 w/v, HS 0.5 ml  Ragweed, Mix (Giant, Short) 1:20 w/v, HS 0.5 ml  Russian Thistle 1:20 w/v, HS 0.5 ml  Sagebrush, Mugwort 1:20 w/v, HS 0.5 ml  Sorrel, Sheep 1:20 w/v, HS 0.5 ml  Diluent: HSA qs to 5ml      ORDER FOR ALLERGEN IMMUNOTHERAPY Name of Mix: Mix #3  Grass, Tree   Portillo, White 1:20 w/v, HS  0.5 ml  Birch Mix PRW 1:20 w/v, HS  0.5 ml  Boxelder-Maple Mix BHR (Boxelder Hard Red) 1:20 w/v, HS  0.5 ml  [place], Common 1:20 w/v, HS  0.5 ml  Elm, American 1:20 w/v, HS  0.5 ml  Oak Mix RVW 1:20 w/v, HS 0.5 ml  Pine Mix 1:20 w/v, HS 0.5 ml  Onaway Tree, Black 1:20 w/v, HS 0.5 ml  Jose Grass 1:20 w/v, HS 0.5 ml  Harvey Grass (Std) 100,000 BAU/mL, HS 0.4 ml  Diluent: HSA qs to 5ml           Review of Systems  CONSTITUTIONAL:weight gain   INTEGUMENTARY/SKIN: NEGATIVE for worrisome rashes, moles or lesions  EYES: NEGATIVE for vision changes or  "irritation  ENT/MOUTH: NEGATIVE for ear, mouth and throat problems  RESP: NEGATIVE for significant cough or SOB  BREAST: NEGATIVE for masses, tenderness or discharge  CV: NEGATIVE for chest pain, palpitations or peripheral edema  GI: NEGATIVE for nausea, abdominal pain, heartburn, or change in bowel habits  : NEGATIVE for frequency, dysuria, or hematuria  MUSCULOSKELETAL: NEGATIVE for significant arthralgias or myalgia  NEURO: NEGATIVE for weakness, dizziness or paresthesias  ENDOCRINE: NEGATIVE for temperature intolerance, skin/hair changes  HEME: NEGATIVE for bleeding problems  PSYCHIATRIC: NEGATIVE for changes in mood or affect     Objective    Exam  /80   Pulse 96   Temp 97.4  F (36.3  C) (Tympanic)   Resp 11   Ht 2.007 m (6' 7\")   Wt 122.9 kg (271 lb)   SpO2 97%   BMI 30.53 kg/m     Estimated body mass index is 30.53 kg/m  as calculated from the following:    Height as of this encounter: 2.007 m (6' 7\").    Weight as of this encounter: 122.9 kg (271 lb).    Physical Exam  GENERAL: alert, no distress, and obese  EYES: Eyes grossly normal to inspection, PERRL and conjunctivae and sclerae normal  HENT: ear canals and TM's normal, nose and mouth without ulcers or lesions  NECK: no adenopathy, no asymmetry, masses, or scars  RESP: lungs clear to auscultation - no rales, rhonchi or wheezes  CV: regular rate and rhythm, normal S1 S2, no S3 or S4, no murmur, click or rub, no peripheral edema  ABDOMEN: soft, nontender, no hepatosplenomegaly, no masses and bowel sounds normal  MS: no gross musculoskeletal defects noted, no edema  SKIN: no suspicious lesions or rashes  NEURO: Normal strength and tone, mentation intact and speech normal  PSYCH: mentation appears normal, affect normal/bright        Signed Electronically by: Jacquie Flores PA-C    "

## 2025-03-14 NOTE — PATIENT INSTRUCTIONS
Please schedule a fasting lab appointment (nothing to eat or drink 9 hours prior except water and/or your medications) at your earliest convenience.        I will notify you of lab results via [employer].    Continue chantix as needed      Patient Education   Preventive Care Advice   This is general advice given by our system to help you stay healthy. However, your care team may have specific advice just for you. Please talk to your care team about your preventive care needs.  Nutrition  Eat 5 or more servings of fruits and vegetables each day.  Try wheat bread, brown rice and whole grain pasta (instead of white bread, rice, and pasta).  Get enough calcium and vitamin D. Check the label on foods and aim for 100% of the RDA (recommended daily allowance).  Lifestyle  Exercise at least 150 minutes each week  (30 minutes a day, 5 days a week).  Do muscle strengthening activities 2 days a week. These help control your weight and prevent disease.  No smoking.  Wear sunscreen to prevent skin cancer.  Have a dental exam and cleaning every 6 months.  Yearly exams  See your health care team every year to talk about:  Any changes in your health.  Any medicines your care team has prescribed.  Preventive care, family planning, and ways to prevent chronic diseases.  Shots (vaccines)   HPV shots (up to age 26), if you've never had them before.  Hepatitis B shots (up to age 59), if you've never had them before.  COVID-19 shot: Get this shot when it's due.  Flu shot: Get a flu shot every year.  Tetanus shot: Get a tetanus shot every 10 years.  Pneumococcal, hepatitis A, and RSV shots: Ask your care team if you need these based on your risk.  Shingles shot (for age 50 and up)  General health tests  Diabetes screening:  Starting at age 35, Get screened for diabetes at least every 3 years.  If you are younger than age 35, ask your care team if you should be screened for diabetes.  Cholesterol test: At age 39, start having a cholesterol  test every 5 years, or more often if advised.  Bone density scan (DEXA): At age 50, ask your care team if you should have this scan for osteoporosis (brittle bones).  Hepatitis C: Get tested at least once in your life.  STIs (sexually transmitted infections)  Before age 24: Ask your care team if you should be screened for STIs.  After age 24: Get screened for STIs if you're at risk. You are at risk for STIs (including HIV) if:  You are sexually active with more than one person.  You don't use condoms every time.  You or a partner was diagnosed with a sexually transmitted infection.  If you are at risk for HIV, ask about PrEP medicine to prevent HIV.  Get tested for HIV at least once in your life, whether you are at risk for HIV or not.  Cancer screening tests  Cervical cancer screening: If you have a cervix, begin getting regular cervical cancer screening tests starting at age 21.  Breast cancer scan (mammogram): If you've ever had breasts, begin having regular mammograms starting at age 40. This is a scan to check for breast cancer.  Colon cancer screening: It is important to start screening for colon cancer at age 45.  Have a colonoscopy test every 10 years (or more often if you're at risk) Or, ask your provider about stool tests like a FIT test every year or Cologuard test every 3 years.  To learn more about your testing options, visit:   .  For help making a decision, visit:   https://bit.ly/jg78894.  Prostate cancer screening test: If you have a prostate, ask your care team if a prostate cancer screening test (PSA) at age 55 is right for you.  Lung cancer screening: If you are a current or former smoker ages 50 to 80, ask your care team if ongoing lung cancer screenings are right for you.  For informational purposes only. Not to replace the advice of your health care provider. Copyright   2023 [employer]. All rights reserved. Clinically reviewed by the [facility].  [employer] 472004 - REV 01/24.

## 2025-03-24 ENCOUNTER — TELEPHONE (OUTPATIENT)
Dept: ALLERGY | Facility: CLINIC | Age: 35
End: 2025-03-24
Payer: COMMERCIAL

## 2025-03-24 NOTE — TELEPHONE ENCOUNTER
Allergy immunotherapy dosing/clarification. Please advise restart dose for immunotherapy as well as duration of time restart dose is valid.    Patient is currently in the building  phase of therapy.   Patient currently has Red vials available on site. If dose reduction requires new serum mixing for patient, please provide ample time for mixing when advising duration restart dose is valid.    Hx of reactions to immunotherapy: NO  Hx of asthma: NO  Reaction with last shot : No        Maintenance Dose: Red 0.5 mL  Last injection given on 2/24/2025.   Number of days from last injection 29 (scheduled for 3/25/25)  Patient received building dose at 28 days at last injection in error.       Vial Color Content  Dose   Red 1:1 Grass, Trees  0.4     Red 1:1 Dog, Weeds  0.4     Red 1:1 Cat, Molds  0.4             Signature  Carrie Martin RN

## 2025-03-24 NOTE — TELEPHONE ENCOUNTER
Red 1: 1, 0.3 mL, 0.4 mL, and then per protocol.  The dose is valid until March 27, 2025.  If the patient does not come until that day, further dose adjustments should be considered.     Wilber Mccarthy MD

## 2025-03-27 NOTE — TELEPHONE ENCOUNTER
Patient cancelled previous allergy shot appointment.     Please advise new dosing orders, building schedule, and date which orders are valid through.  Patient's last shot was 2/24/25, dose was 0.4ml red, next appointment scheduled for 3/31/25 which will be 35 days.     New orders will be added to immunotherapy flowsheet once they are received.     SHYLA MacN, RN, PHN

## 2025-03-31 ENCOUNTER — ALLIED HEALTH/NURSE VISIT (OUTPATIENT)
Dept: ALLERGY | Facility: CLINIC | Age: 35
End: 2025-03-31
Payer: COMMERCIAL

## 2025-03-31 DIAGNOSIS — J30.81 ALLERGIC RHINITIS DUE TO ANIMAL HAIR AND DANDER: ICD-10-CM

## 2025-03-31 DIAGNOSIS — J30.89 ALLERGIC RHINITIS DUE TO MOLD: ICD-10-CM

## 2025-03-31 DIAGNOSIS — J30.1 SEASONAL ALLERGIC RHINITIS DUE TO POLLEN: Primary | ICD-10-CM

## 2025-03-31 PROCEDURE — 95117 IMMUNOTHERAPY INJECTIONS: CPT

## 2025-03-31 NOTE — PROGRESS NOTES
Verbal order given by Dr. Greenfield that patient will no longer require an epi pen for immunotherapy. Patient advised and verbalized good understanding.    SHYLA MacN, RN, PHN

## 2025-03-31 NOTE — TELEPHONE ENCOUNTER
OK to give 0.3mL red vial for all injections. If tolerated, build back to maintenance dose in 0.1mL increments every 3-14 days.

## 2025-04-14 ENCOUNTER — OFFICE VISIT (OUTPATIENT)
Dept: ALLERGY | Facility: CLINIC | Age: 35
End: 2025-04-14
Payer: COMMERCIAL

## 2025-04-14 ENCOUNTER — ALLIED HEALTH/NURSE VISIT (OUTPATIENT)
Dept: ALLERGY | Facility: CLINIC | Age: 35
End: 2025-04-14
Payer: COMMERCIAL

## 2025-04-14 VITALS — SYSTOLIC BLOOD PRESSURE: 131 MMHG | OXYGEN SATURATION: 98 % | DIASTOLIC BLOOD PRESSURE: 86 MMHG | HEART RATE: 86 BPM

## 2025-04-14 DIAGNOSIS — H10.13 ALLERGIC CONJUNCTIVITIS, BILATERAL: ICD-10-CM

## 2025-04-14 DIAGNOSIS — J30.1 SEASONAL ALLERGIC RHINITIS DUE TO POLLEN: Primary | ICD-10-CM

## 2025-04-14 DIAGNOSIS — J30.81 ALLERGIC RHINITIS DUE TO ANIMAL HAIR AND DANDER: ICD-10-CM

## 2025-04-14 DIAGNOSIS — J30.89 ALLERGIC RHINITIS DUE TO MOLD: ICD-10-CM

## 2025-04-14 DIAGNOSIS — J30.81 ALLERGIC RHINITIS DUE TO ANIMALS: ICD-10-CM

## 2025-04-14 PROCEDURE — 99213 OFFICE O/P EST LOW 20 MIN: CPT | Mod: 25 | Performed by: ALLERGY & IMMUNOLOGY

## 2025-04-14 PROCEDURE — 3079F DIAST BP 80-89 MM HG: CPT | Performed by: ALLERGY & IMMUNOLOGY

## 2025-04-14 PROCEDURE — 3075F SYST BP GE 130 - 139MM HG: CPT | Performed by: ALLERGY & IMMUNOLOGY

## 2025-04-14 PROCEDURE — 95117 IMMUNOTHERAPY INJECTIONS: CPT

## 2025-04-14 NOTE — PROGRESS NOTES
"Oswaldo Garcia was seen in the Allergy Clinic at [facility].      Oswaldo Garcia is a 34 year old Not  or  male who is seen today for a follow-up visit.    Started SCIT in 10/2023 and reached maintenance dose in 12/2023. No history of adverse reactions to treatment. Has noticed improvement in his symptoms - has no breakthrough symptoms when he takes cetirizine daily. Prior to starting immunotherapy he had symptoms regardless of whether or not he was taking medication. Still has \"allergy attacks\" including congestion, rhinorrhea, and sneezing if he skips a dose of cetirizine. Not taking any other medications for allergy symptoms including nasal sprays or eye drops. No sinus infections since his last visit.      Past Medical History:   Diagnosis Date    Acute appendicitis with peritoneal abscess 02/11/09    D/C 02/13/09    Fracture of ankle, trimalleolar, closed      Family History   Problem Relation Age of Onset    No Known Problems Mother     Diabetes Type 2  Father     No Known Problems Sister     No Known Problems Brother     Coronary Artery Disease Paternal Grandfather         MI age 65    Colon Cancer No family hx of     Prostate Cancer No family hx of      Social History     Tobacco Use    Smoking status: Former     Types: Cigarettes    Smokeless tobacco: Never   Vaping Use    Vaping status: Every Day    Substances: Nicotine, Flavoring    Devices: [employer] tank   Substance Use Topics    Alcohol use: Yes     Comment: occ    Drug use: No     Social History     Social History Narrative    ** Merged History Encounter **            Past medical, family, and social history were reviewed.        Current Outpatient Medications:     cetirizine (ZYRTEC) 10 MG tablet, Take 10 mg by mouth daily, Disp: , Rfl:     ciclopirox (PENLAC) 8 % external solution, Apply to adjacent skin and affected nails daily.  Remove with alcohol every 7 days, then repeat., Disp: 6.6 mL, Rfl: 11    " EPINEPHrine (ANY BX GENERIC EQUIV) 0.3 MG/0.3ML injection 2-pack, Inject 0.3 mLs (0.3 mg) into the muscle as needed for anaphylaxis May repeat one time in 5-15 minutes if response to initial dose is inadequate., Disp: 2 each, Rfl: 2    ORDER FOR ALLERGEN IMMUNOTHERAPY, Name of Mix: Mix #1  Mold, Cat Cat Hair, Standardized A.P. 10,000 BAU/mL, HS  2.0 ml  Alternaria Tenuis 1:10 w/v, HS  0.5 ml Epicoccum Nigrum 1:10 w/v, HS 0.5 ml Diluent: HSA qs to 5ml, Disp: , Rfl:     ORDER FOR ALLERGEN IMMUNOTHERAPY, Name of Mix: Mix #2  Dog, Weeds Dog Hair-Dander, UF  1:650 w/v, HS  1.0 ml  Kochia 1:20 w/v, HS 0.5 ml Nettle 1:20 w/v, HS 0.5 ml Plantain, English 1:20 w/v, HS 0.5 ml Ragweed, Mix (Giant, Short) 1:20 w/v, HS 0.5 ml Russian Thistle 1:20 w/v, HS 0.5 ml Sagebrush, Mugwort 1:20 w/v, HS 0.5 ml Sorrel, Sheep 1:20 w/v, HS 0.5 ml Diluent: HSA qs to 5ml, Disp: , Rfl:     ORDER FOR ALLERGEN IMMUNOTHERAPY, Name of Mix: Mix #3  Grass, Tree  Portillo, White 1:20 w/v, HS  0.5 ml Birch Mix PRW 1:20 w/v, HS  0.5 ml Boxelder-Maple Mix BHR (Boxelder Hard Red) 1:20 w/v, HS  0.5 ml Avery, Common 1:20 w/v, HS  0.5 ml Elm, American 1:20 w/v, HS  0.5 ml Oak Mix RVW 1:20 w/v, HS 0.5 ml Pine Mix 1:20 w/v, HS 0.5 ml [place] Tree, Black 1:20 w/v, HS 0.5 ml Jose Grass 1:20 w/v, HS 0.5 ml Harvey Grass (Std) 100,000 BAU/mL, HS 0.4 ml Diluent: HSA qs to 5ml, Disp: , Rfl:     varenicline (CHANTIX) 1 MG tablet, Take 1 tablet (1 mg) by mouth 2 times daily. After finishing starter pack, Disp: 180 tablet, Rfl: 2    vitamin D3 (CHOLECALCIFEROL) 50 mcg (2000 units) tablet, Take 1 tablet (50 mcg) by mouth daily., Disp: 90 tablet, Rfl: 3  No Known Allergies    EXAM:   /86   Pulse 86   SpO2 98%   Physical Exam  Vitals and nursing note reviewed.   Constitutional:       Appearance: Normal appearance.   HENT:      Head: Normocephalic and atraumatic.      Right Ear: External ear normal.      Left Ear: External ear normal.      Nose: No mucosal edema,  congestion or rhinorrhea.      Mouth/Throat:      Mouth: Mucous membranes are moist. No oral lesions.      Pharynx: Oropharynx is clear. Uvula midline. No posterior oropharyngeal erythema.   Eyes:      General: Lids are normal.      Extraocular Movements: Extraocular movements intact.      Conjunctiva/sclera: Conjunctivae normal.   Neck:      Comments: No asymmetry, masses, or scars  Cardiovascular:      Rate and Rhythm: Normal rate and regular rhythm.      Heart sounds: S1 normal and S2 normal. No murmur heard.  Pulmonary:      Effort: Pulmonary effort is normal. No respiratory distress.      Breath sounds: Normal breath sounds and air entry.   Musculoskeletal:      Comments: No musculoskeletal defects noted   Skin:     General: Skin is warm and dry.      Findings: No lesion or rash.   Neurological:      General: No focal deficit present.      Mental Status: He is alert.   Psychiatric:         Mood and Affect: Mood and affect normal.           WORKUP:  None    ASSESSMENT/PLAN:  Oswaldo Garcia is a 34 year old male here for a follow-up visit.    1. Seasonal allergic rhinitis due to pollen (Primary) - Completed approximately 1.5 years of allergen immunotherapy treatment at maintenance dose. No adverse reactions to treatment. Reports improvement in rhinoconjunctivitis symptoms and better response to antihistamine therapy. Counseled regarding the risks, including potentially fatal anaphylaxis, benefits, and recommended duration of treatment and he plans to continue at this time.     - continue allergen immunotherapy per protocol  - epinephrine auto-injector required per protocol  - take 10mg of cetirizine daily as needed  - Adult Allergy Clinic Follow-Up Order; Future    2. Allergic rhinitis due to animals    - Adult Allergy Clinic Follow-Up Order; Future    3. Allergic rhinitis due to mold    - Adult Allergy Clinic Follow-Up Order; Future    4. Allergic conjunctivitis, bilateral    - Adult Allergy Clinic Follow-Up  Order; Future      Follow-up in 1 year, sooner if needed      Thank you for allowing me to participate in the care of Oswaldo Garcia.      Ewa Greenfield MD, FAAAAI  Allergy/Immunology  [facility] - [facility]      Consent was obtained from the patient to use an AI documentation tool in the creation of this note.    Chart documentation done in part with Dragon Voice Recognition Software. Although reviewed after completion, some word and grammatical errors may remain.

## 2025-04-14 NOTE — LETTER
"4/14/2025      Oswaldo Garcia  [address] [place] [address]      Dear Colleague,    Thank you for referring your patient, Oswaldo Garcia, to the [facility]. Please see a copy of my visit note below.    Oswaldo Garcia was seen in the Allergy Clinic at [facility].      Oswaldo Garcia is a 34 year old Not  or  male who is seen today for a follow-up visit.    Started SCIT in 10/2023 and reached maintenance dose in 12/2023. No history of adverse reactions to treatment. Has noticed improvement in his symptoms - has no breakthrough symptoms when he takes cetirizine daily. Prior to starting immunotherapy he had symptoms regardless of whether or not he was taking medication. Still has \"allergy attacks\" including congestion, rhinorrhea, and sneezing if he skips a dose of cetirizine. Not taking any other medications for allergy symptoms including nasal sprays or eye drops. No sinus infections since his last visit.      Past Medical History:   Diagnosis Date     Acute appendicitis with peritoneal abscess 02/11/09    D/C 02/13/09     Fracture of ankle, trimalleolar, closed      Family History   Problem Relation Age of Onset     No Known Problems Mother      Diabetes Type 2  Father      No Known Problems Sister      No Known Problems Brother      Coronary Artery Disease Paternal Grandfather         MI age 65     Colon Cancer No family hx of      Prostate Cancer No family hx of      Social History     Tobacco Use     Smoking status: Former     Types: Cigarettes     Smokeless tobacco: Never   Vaping Use     Vaping status: Every Day     Substances: Nicotine, Flavoring     Devices: Refillable tank   Substance Use Topics     Alcohol use: Yes     Comment: occ     Drug use: No     Social History     Social History Narrative    ** Merged History Encounter **            Past medical, family, and social history were reviewed.        Current Outpatient " Medications:      cetirizine (ZYRTEC) 10 MG tablet, Take 10 mg by mouth daily, Disp: , Rfl:      ciclopirox (PENLAC) 8 % external solution, Apply to adjacent skin and affected nails daily.  Remove with alcohol every 7 days, then repeat., Disp: 6.6 mL, Rfl: 11     EPINEPHrine (ANY BX GENERIC EQUIV) 0.3 MG/0.3ML injection 2-pack, Inject 0.3 mLs (0.3 mg) into the muscle as needed for anaphylaxis May repeat one time in 5-15 minutes if response to initial dose is inadequate., Disp: 2 each, Rfl: 2     ORDER FOR ALLERGEN IMMUNOTHERAPY, Name of Mix: Mix #1  Mold, Cat Cat Hair, Standardized A.P. 10,000 BAU/mL, HS  2.0 ml  Alternaria Tenuis 1:10 w/v, HS  0.5 ml Epicoccum Nigrum 1:10 w/v, HS 0.5 ml Diluent: HSA qs to 5ml, Disp: , Rfl:      ORDER FOR ALLERGEN IMMUNOTHERAPY, Name of Mix: Mix #2  Dog, Weeds Dog Hair-Dander, UF  1:650 w/v, HS  1.0 ml  Kochia 1:20 w/v, HS 0.5 ml Nettle 1:20 w/v, HS 0.5 ml Plantain, English 1:20 w/v, HS 0.5 ml Ragweed, Mix (Giant, Short) 1:20 w/v, HS 0.5 ml Russian Thistle 1:20 w/v, HS 0.5 ml Sagebrush, Mugwort 1:20 w/v, HS 0.5 ml Sorrel, Sheep 1:20 w/v, HS 0.5 ml Diluent: HSA qs to 5ml, Disp: , Rfl:      ORDER FOR ALLERGEN IMMUNOTHERAPY, Name of Mix: Mix #3  Grass, Tree  Portillo, White 1:20 w/v, HS  0.5 ml Birch Mix PRW 1:20 w/v, HS  0.5 ml Boxelder-Maple Mix BHR (Boxelder Hard Red) 1:20 w/v, HS  0.5 ml [place], Common 1:20 w/v, HS  0.5 ml Elm, American 1:20 w/v, HS  0.5 ml Oak Mix RVW 1:20 w/v, HS 0.5 ml Pine Mix 1:20 w/v, HS 0.5 ml [place] Tree, Black 1:20 w/v, HS 0.5 ml Jose Grass 1:20 w/v, HS 0.5 ml Harvey Grass (Std) 100,000 BAU/mL, HS 0.4 ml Diluent: HSA qs to 5ml, Disp: , Rfl:      varenicline (CHANTIX) 1 MG tablet, Take 1 tablet (1 mg) by mouth 2 times daily. After finishing starter pack, Disp: 180 tablet, Rfl: 2     vitamin D3 (CHOLECALCIFEROL) 50 mcg (2000 units) tablet, Take 1 tablet (50 mcg) by mouth daily., Disp: 90 tablet, Rfl: 3  No Known Allergies    EXAM:   /86   Pulse 86    SpO2 98%   Physical Exam  Vitals and nursing note reviewed.   Constitutional:       Appearance: Normal appearance.   HENT:      Head: Normocephalic and atraumatic.      Right Ear: External ear normal.      Left Ear: External ear normal.      Nose: No mucosal edema, congestion or rhinorrhea.      Mouth/Throat:      Mouth: Mucous membranes are moist. No oral lesions.      Pharynx: Oropharynx is clear. Uvula midline. No posterior oropharyngeal erythema.   Eyes:      General: Lids are normal.      Extraocular Movements: Extraocular movements intact.      Conjunctiva/sclera: Conjunctivae normal.   Neck:      Comments: No asymmetry, masses, or scars  Cardiovascular:      Rate and Rhythm: Normal rate and regular rhythm.      Heart sounds: S1 normal and S2 normal. No murmur heard.  Pulmonary:      Effort: Pulmonary effort is normal. No respiratory distress.      Breath sounds: Normal breath sounds and air entry.   Musculoskeletal:      Comments: No musculoskeletal defects noted   Skin:     General: Skin is warm and dry.      Findings: No lesion or rash.   Neurological:      General: No focal deficit present.      Mental Status: He is alert.   Psychiatric:         Mood and Affect: Mood and affect normal.           WORKUP:  None    ASSESSMENT/PLAN:  Oswaldo Garcia is a 34 year old male here for a follow-up visit.    1. Seasonal allergic rhinitis due to pollen (Primary) - Completed approximately 1.5 years of allergen immunotherapy treatment at maintenance dose. No adverse reactions to treatment. Reports improvement in rhinoconjunctivitis symptoms and better response to antihistamine therapy. Counseled regarding the risks, including potentially fatal anaphylaxis, benefits, and recommended duration of treatment and he plans to continue at this time.     - continue allergen immunotherapy per protocol  - epinephrine auto-injector required per protocol  - take 10mg of cetirizine daily as needed  - Adult Allergy Clinic Follow-Up  Order; Future    2. Allergic rhinitis due to animals    - Adult Allergy Clinic Follow-Up Order; Future    3. Allergic rhinitis due to mold    - Adult Allergy Clinic Follow-Up Order; Future    4. Allergic conjunctivitis, bilateral    - Adult Allergy Clinic Follow-Up Order; Future      Follow-up in 1 year, sooner if needed      Thank you for allowing me to participate in the care of Oswaldo Garcia.      Ewa Greenfield MD, [facility]  Allergy/Immunology  [facility]      Consent was obtained from the patient to use an AI documentation tool in the creation of this note.    Chart documentation done in part with Dragon Voice Recognition Software. Although reviewed after completion, some word and grammatical errors may remain.      Again, thank you for allowing me to participate in the care of your patient.        Sincerely,        Ewa Greenfield MD    Electronically signed

## 2025-05-06 ENCOUNTER — VIRTUAL VISIT (OUTPATIENT)
Facility: CLINIC | Age: 35
End: 2025-05-06
Attending: FAMILY MEDICINE
Payer: COMMERCIAL

## 2025-05-06 DIAGNOSIS — F41.1 GENERALIZED ANXIETY DISORDER: Primary | ICD-10-CM

## 2025-05-06 DIAGNOSIS — F43.9 TRAUMA AND STRESSOR-RELATED DISORDER: ICD-10-CM

## 2025-05-06 DIAGNOSIS — Z13.39 ATTENTION DEFICIT HYPERACTIVITY DISORDER (ADHD) EVALUATION: ICD-10-CM

## 2025-05-06 PROCEDURE — 90832 PSYTX W PT 30 MINUTES: CPT | Mod: 95 | Performed by: PSYCHOLOGIST

## 2025-05-06 ASSESSMENT — PATIENT HEALTH QUESTIONNAIRE - PHQ9
SUM OF ALL RESPONSES TO PHQ QUESTIONS 1-9: 13
SUM OF ALL RESPONSES TO PHQ QUESTIONS 1-9: 13
10. IF YOU CHECKED OFF ANY PROBLEMS, HOW DIFFICULT HAVE THESE PROBLEMS MADE IT FOR YOU TO DO YOUR WORK, TAKE CARE OF THINGS AT HOME, OR GET ALONG WITH OTHER PEOPLE: VERY DIFFICULT

## 2025-05-06 ASSESSMENT — ANXIETY QUESTIONNAIRES
IF YOU CHECKED OFF ANY PROBLEMS ON THIS QUESTIONNAIRE, HOW DIFFICULT HAVE THESE PROBLEMS MADE IT FOR YOU TO DO YOUR WORK, TAKE CARE OF THINGS AT HOME, OR GET ALONG WITH OTHER PEOPLE: SOMEWHAT DIFFICULT
2. NOT BEING ABLE TO STOP OR CONTROL WORRYING: MORE THAN HALF THE DAYS
1. FEELING NERVOUS, ANXIOUS, OR ON EDGE: MORE THAN HALF THE DAYS
3. WORRYING TOO MUCH ABOUT DIFFERENT THINGS: MORE THAN HALF THE DAYS
GAD7 TOTAL SCORE: 12
8. IF YOU CHECKED OFF ANY PROBLEMS, HOW DIFFICULT HAVE THESE MADE IT FOR YOU TO DO YOUR WORK, TAKE CARE OF THINGS AT HOME, OR GET ALONG WITH OTHER PEOPLE?: SOMEWHAT DIFFICULT
4. TROUBLE RELAXING: MORE THAN HALF THE DAYS
7. FEELING AFRAID AS IF SOMETHING AWFUL MIGHT HAPPEN: NOT AT ALL
5. BEING SO RESTLESS THAT IT IS HARD TO SIT STILL: MORE THAN HALF THE DAYS
GAD7 TOTAL SCORE: 12
6. BECOMING EASILY ANNOYED OR IRRITABLE: MORE THAN HALF THE DAYS
GAD7 TOTAL SCORE: 12
7. FEELING AFRAID AS IF SOMETHING AWFUL MIGHT HAPPEN: NOT AT ALL

## 2025-05-06 NOTE — PROGRESS NOTES
[facility]   Mental Health & Addiction Services     Progress Note - Initial Visit    Client Name:  Oswaldo Garcia Date: May 6, 2025         Service Type: Individual     Visit Start Time: 11:00am  Visit End Time: 11:30am    Visit #: 1    Attendees: Client attended alone    Service Modality:  Video Visit:      Provider verified identity through the following two step process.  Patient provided:  Patient  and Patient address    Telemedicine Visit: The patient's condition can be safely assessed and treated via synchronous audio and visual telemedicine encounter.      Reason for Telemedicine Visit: Patient convenience (e.g. access to timely appointments / distance to available provider)    Originating Site (Patient Location): Patient's place of employment    Distant Site (Provider Location): Provider Remote Setting- Home Office    Consent:  The patient/guardian has verbally consented to: the potential risks and benefits of telemedicine (video visit) versus in person care; bill my insurance or make self-payment for services provided; and responsibility for payment of non-covered services.     Patient would like the video invitation sent by:  Send to e-mail at: [email or web address]    Mode of Communication:  Video Conference via Amwell    Distant Location (Provider):  Off-site    As the provider I attest to compliance with applicable laws and regulations related to telemedicine.       DATA:  Extended Session (53+ minutes): No  Interactive Complexity: No   Crisis: No     Presenting Concerns/Current Stressors:   Patient presented to session to initiate the ADHD evaluation process.           2025    10:16 AM   PHQ   PHQ-9 Total Score 13    Q9: Thoughts of better off dead/self-harm past 2 weeks Not at all       Patient-reported            2024     4:49 PM 2025    10:26 AM   SANAZ-7 SCORE   Total Score  12 (moderate anxiety)   Total Score 7 12        Patient-reported        ASSESSMENT:  Mental Status Assessment:  Appearance:   Appropriate   Eye Contact:   Good   Psychomotor Behavior: Restless   Attitude:   Cooperative   Orientation:   All  Speech   Rate / Production: Normal/ Responsive   Volume:  Normal   Mood:    Euthymic  Affect:    Appropriate   Thought Content:  Clear   Thought Form:  Logical   Insight:    Good       Safety Issues and Plan for Safety and Risk Management:   [place] Suicide Severity Rating Scale (Lifetime/Recent)      5/6/2025    11:05 AM   [place] Suicide Severity Rating (Lifetime/Recent)   1. Wish to be Dead (Lifetime) N   Wish to be Dead Description (Lifetime) Maybe in teen years. No plan.   1. Wish to be Dead (Past 1 Month) N   2. Non-Specific Active Suicidal Thoughts (Lifetime) Y   Non-Specific Active Suicidal Thought Description (Lifetime) Maybe in teen years.   2. Non-Specific Active Suicidal Thoughts (Past 1 Month) N   3. Active Suicidal Ideation with any Methods (Not Plan) Without Intent to Act (Lifetime) N   4. Active Suicidal Ideation with Some Intent to Act, Without Specific Plan (Lifetime) N   5. Active Suicidal Ideation with Specific Plan and Intent (Lifetime) N   Actual Attempt (Lifetime) N   Has subject engaged in non-suicidal self-injurious behavior? (Lifetime) N   Interrupted Attempts (Lifetime) N   Aborted or Self-Interrupted Attempt (Lifetime) N   Preparatory Acts or Behavior (Lifetime) N   Calculated C-SSRS Risk Score (Lifetime/Recent) No Risk Indicated     Patient denies current fears or concerns for personal safety.  Patient denies current or recent suicidal ideation or behaviors.  Patient denies current or recent homicidal ideation or behaviors.  Patient denies current or recent self injurious behavior or ideation.  Patient denies other safety concerns.  Recommended that patient call 911 or go to the local ED should there be a change in any of these risk factors   Patient reports there are no firearms in the house.    Diagnostic  Criteria:  F41.1:  A. Excessive anxiety and worry, occurring more days than not for at least 6 months about a number of events or activities.   B. The individual finds it difficult to control the worry.  C. The anxiety and worry are associated with 3 or more of 6 symptoms.  D. The anxiety, worry, or physical symptoms cause clinically significant distress or impairment in social, occupational, or other important areas of functioning.  E. The disturbance is not attributable to the physiological effects of a substance (e.g., a drug of abuse, a medication) or another medical condition (e.g., hyperthyroidism).  F. The disturbance is not better explained by another mental disorder (e.g., anxiety or worry about having panic attacks in panic disorder, negative evaluation in social anxiety disorder [social phobia], contamination or other obsessions in obsessive-compulsive disorder, separation from attachment figures in separation anxiety disorder, reminders of traumatic events in posttraumatic stress disorder, gaining weight in anorexia nervosa, physical complaints in somatic symptom disorder, perceived appearance flaws in body dysmorphic disorder, having a serious illness in illness anxiety disorder, or the content of delusional beliefs in schizophrenia or delusional disorder).    F43.9:  A. Exposure to actual or threatened death, serious injury, or sexual violence in 1 or more of the following ways:  1. Directly experiencing the traumatic event.  B. Presence of 1 or more of the following intrusion symptoms associated with the traumatic event, beginning after the traumatic event occurred:  1. Recurrent, involuntary, and intrusive distressing memories of the traumatic event.  4. Intense or prolonged psychological distress at exposure to internal or external cues that symbolize or resemble an aspect of the traumatic event.  E. Marked alterations in arousal and reactivity associated with the traumatic event, beginning or  worsening after the traumatic event occurred, as evidenced by 2 or more of the followin. Irritable behavior and angry outbursts typically expressed as verbal or physical aggression toward people or objects.  5. Problems with concentration.  F. Duration of the disturbance is more than 1 month.  G. The disturbance causes clinically significant distress or impairment in social, occupational, or other important areas of functioning.  H. The disturbance is not attributable to the physiological effects of a substance or another medical condition.      DSM5 Diagnoses: (Sustained by DSM5 Criteria Listed Above)  Diagnoses:   1. Generalized anxiety disorder    2. Trauma and stressor-related disorder    3. Attention deficit hyperactivity disorder (ADHD) evaluation      Psychosocial & Contextual Factors: social support, employed, in therapy    PROMIS-10 Scores  Global Mental Health Score: (Patient-Rptd) (P) 10  Global Physical Health Score: (Patient-Rptd) (P) 15   PROMIS TOTAL - SUBSCORES: (Patient-Rptd) (P) 25      Intervention:              Reviewed symptoms and history of presenting concern. Patient endorsed symptoms consistent with depression , anxiety , trauma, and ADHD. Patient denied symptoms associated with josesito, panic, OCD, and perceptual difficulties. Unable to complete diagnostic intake, will be completed in next session.    CBT: socratic questioning, positive reinforcement  EFT: empathetic attunement, emotion checking, emotion naming  MI: open ended questions, affirmations, reflections        Attendance Agreement:  Client has not signed the attendance agreement. Discussed expectations at beginning of this first session and patient agreed.       PLAN:  Provider will continue Diagnostic Assessment in next session. Patient will complete Samantha questionnaires  and CNS Vital Signs prior to next session (2025).    Patient meets the following risk assessment and triage: Patient denied any  current/recent/lifetime history of suicidal ideation and/or behaviors.  No safety plan indicated at this time.     Medical necessity criteria is warranted in order to: Measure a psychological disorder and its severity and functional impairment to determine psychiatric diagnosis when a mental illness is suspected, or to achieve a differential diagnosis from a range of medical/psychological disorders that present with similar constellations of symptoms (e.g., determination and measurement of anxiety severity and impact in the presence of ongoing asthma or heart disease), Perform symptom measurement to objectively measure treatment effectiveness and/or determine the need to refer for pharmacological treatment or other medical evaluation (e.g., based on severity and chronicity of symptoms), and Evaluate primary symptoms of impaired attention and concentration that can occur in many neurological and psychiatric conditions.    Medical necessity for psychological assessment is warranted as a result of the following: (1) A specific clinical question is posed that relates to the condition/symptoms being addressed (2) The question cannot be adequately addressed by clinical interview and/or behavioral observation (3) Results of psychological testing are reasonably expected to provide an answer to the query (4) It is reasonably expected that the testing will provide information leading to a clearer diagnosis and/or guide treatment planning with an expectation of improved clinical outcome.    I acknowledge that, based upon current clinical information, the patient and I have reviewed and discussed issues pertaining to the purpose of therapy/testing, potential therapeutic goals, procedures, risks and benefits, and estimated duration of therapy/testing. Issues pertaining to fees/insurance and confidentiality were also addressed with the patient, who indicated understanding and elected to continue with appointments. I will not be  providing any experimental procedures and, if we agree that a change in clinical procedure would be more beneficial, I will obtain specific consent for that procedure or refer you to another provider who has expertise in that area.       Veronica Knox PsyD,   Clinical Psychologist

## 2025-05-12 ENCOUNTER — ALLIED HEALTH/NURSE VISIT (OUTPATIENT)
Dept: ALLERGY | Facility: CLINIC | Age: 35
End: 2025-05-12
Payer: COMMERCIAL

## 2025-05-12 DIAGNOSIS — J30.1 SEASONAL ALLERGIC RHINITIS DUE TO POLLEN: Primary | ICD-10-CM

## 2025-05-12 DIAGNOSIS — J30.81 ALLERGIC RHINITIS DUE TO ANIMAL HAIR AND DANDER: ICD-10-CM

## 2025-05-12 DIAGNOSIS — J30.89 ALLERGIC RHINITIS DUE TO MOLD: ICD-10-CM

## 2025-05-12 PROCEDURE — 95117 IMMUNOTHERAPY INJECTIONS: CPT

## 2025-05-13 ENCOUNTER — VIRTUAL VISIT (OUTPATIENT)
Facility: CLINIC | Age: 35
End: 2025-05-13
Payer: COMMERCIAL

## 2025-05-13 DIAGNOSIS — F43.9 TRAUMA AND STRESSOR-RELATED DISORDER: ICD-10-CM

## 2025-05-13 DIAGNOSIS — Z13.39 ATTENTION DEFICIT HYPERACTIVITY DISORDER (ADHD) EVALUATION: ICD-10-CM

## 2025-05-13 DIAGNOSIS — F41.1 GENERALIZED ANXIETY DISORDER: Primary | ICD-10-CM

## 2025-05-13 PROCEDURE — 90791 PSYCH DIAGNOSTIC EVALUATION: CPT | Mod: 95 | Performed by: PSYCHOLOGIST
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
## 2025-05-19 ENCOUNTER — ALLIED HEALTH/NURSE VISIT (OUTPATIENT)
Dept: ALLERGY | Facility: CLINIC | Age: 35
End: 2025-05-19
Payer: COMMERCIAL

## 2025-05-19 DIAGNOSIS — J30.81 ALLERGIC RHINITIS DUE TO ANIMAL HAIR AND DANDER: ICD-10-CM

## 2025-05-19 DIAGNOSIS — J30.1 SEASONAL ALLERGIC RHINITIS DUE TO POLLEN: Primary | ICD-10-CM

## 2025-05-19 DIAGNOSIS — J30.89 ALLERGIC RHINITIS DUE TO MOLD: ICD-10-CM

## 2025-05-19 PROCEDURE — 95117 IMMUNOTHERAPY INJECTIONS: CPT

## 2025-05-29 ENCOUNTER — ALLIED HEALTH/NURSE VISIT (OUTPATIENT)
Dept: ALLERGY | Facility: CLINIC | Age: 35
End: 2025-05-29
Payer: COMMERCIAL

## 2025-05-29 DIAGNOSIS — J30.81 ALLERGIC RHINITIS DUE TO ANIMAL HAIR AND DANDER: ICD-10-CM

## 2025-05-29 DIAGNOSIS — J30.1 SEASONAL ALLERGIC RHINITIS DUE TO POLLEN: Primary | ICD-10-CM

## 2025-05-29 DIAGNOSIS — J30.89 ALLERGIC RHINITIS DUE TO MOLD: ICD-10-CM

## 2025-06-02 ENCOUNTER — OFFICE VISIT (OUTPATIENT)
Dept: FAMILY MEDICINE | Facility: CLINIC | Age: 35
End: 2025-06-02
Payer: COMMERCIAL

## 2025-06-02 VITALS
WEIGHT: 283 LBS | OXYGEN SATURATION: 99 % | DIASTOLIC BLOOD PRESSURE: 88 MMHG | TEMPERATURE: 97.9 F | BODY MASS INDEX: 31.88 KG/M2 | SYSTOLIC BLOOD PRESSURE: 138 MMHG | RESPIRATION RATE: 16 BRPM | HEART RATE: 80 BPM

## 2025-06-02 DIAGNOSIS — Z20.2 POSSIBLE EXPOSURE TO STI: Primary | ICD-10-CM

## 2025-06-02 PROCEDURE — 1126F AMNT PAIN NOTED NONE PRSNT: CPT | Performed by: NURSE PRACTITIONER

## 2025-06-02 PROCEDURE — 87563 M. GENITALIUM AMP PROBE: CPT | Mod: 90 | Performed by: NURSE PRACTITIONER

## 2025-06-02 PROCEDURE — 99000 SPECIMEN HANDLING OFFICE-LAB: CPT | Performed by: NURSE PRACTITIONER

## 2025-06-02 PROCEDURE — G2211 COMPLEX E/M VISIT ADD ON: HCPCS | Performed by: NURSE PRACTITIONER

## 2025-06-02 PROCEDURE — 99213 OFFICE O/P EST LOW 20 MIN: CPT | Performed by: NURSE PRACTITIONER

## 2025-06-02 PROCEDURE — 3079F DIAST BP 80-89 MM HG: CPT | Performed by: NURSE PRACTITIONER

## 2025-06-02 PROCEDURE — 87798 DETECT AGENT NOS DNA AMP: CPT | Mod: 90 | Performed by: NURSE PRACTITIONER

## 2025-06-02 PROCEDURE — 3075F SYST BP GE 130 - 139MM HG: CPT | Performed by: NURSE PRACTITIONER

## 2025-06-02 ASSESSMENT — ENCOUNTER SYMPTOMS: DYSURIA: 0

## 2025-06-02 ASSESSMENT — PAIN SCALES - GENERAL: PAINLEVEL_OUTOF10: NO PAIN (0)

## 2025-06-02 NOTE — PROGRESS NOTES
Assessment & Plan     1. Possible exposure to STI (Primary)  Wife recently diagnosed with ureaplasma.    Patient asymptomatic.  He respectfully declined gonorrhea/chlamydia testing.  Disposition pending results.  - Urogenital Ureaplasma and Mycoplasma Species by PCR; Future    Follow-up prn.    All questions/concerns addressed. Patient stated understanding/agreement to plan of care.        Note: Chart documentation was done in part with Dragon Voice Recognition software.  Although reviewed after completion, some word and grammatical errors may remain. Please contact author for any clarification or concerns.                Souleymane Pollock is a 34 year old, presenting for the following health issues:  Treatment STD      6/2/2025     3:25 PM   Additional Questions   Roomed by Crystal   Accompanied by self         6/2/2025     3:25 PM   Patient Reported Additional Medications   Patient reports taking the following new medications none     History of Present Illness       Reason for visit:  Ureaplasma Test/ treatment and blood draw for tests ordered from last physical   He is taking medications regularly.            Wife recently diagnosed with ureaplasma.  No symptoms.  No other acute concerns/symptoms at time of exam.      Review of Systems   Genitourinary:  Negative for dysuria and penile discharge.   Constitutional, HEENT, cardiovascular, pulmonary, gi and gu systems are negative, except as otherwise noted.      Current Outpatient Medications   Medication Sig Dispense Refill    cetirizine (ZYRTEC) 10 MG tablet Take 10 mg by mouth daily      ciclopirox (PENLAC) 8 % external solution Apply to adjacent skin and affected nails daily.  Remove with alcohol every 7 days, then repeat. 6.6 mL 11    EPINEPHrine (ANY BX GENERIC EQUIV) 0.3 MG/0.3ML injection 2-pack Inject 0.3 mLs (0.3 mg) into the muscle as needed for anaphylaxis May repeat one time in 5-15 minutes if response to initial dose is inadequate. 2 each 2    ORDER  FOR ALLERGEN IMMUNOTHERAPY Name of Mix: Mix #1  Mold, Cat  Cat Hair, Standardized A.P. 10,000 BAU/mL, HS  2.0 ml   Alternaria Tenuis 1:10 w/v, HS  0.5 ml  Epicoccum Nigrum 1:10 w/v, HS 0.5 ml  Diluent: HSA qs to 5ml      ORDER FOR ALLERGEN IMMUNOTHERAPY Name of Mix: Mix #2  Dog, Weeds  Dog Hair-Dander, UF  1:650 w/v, HS  1.0 ml   Kochia 1:20 w/v, HS 0.5 ml  Nettle 1:20 w/v, HS 0.5 ml  Plantain, English 1:20 w/v, HS 0.5 ml  Ragweed, Mix (Giant, Short) 1:20 w/v, HS 0.5 ml  Russian Thistle 1:20 w/v, HS 0.5 ml  Sagebrush, Mugwort 1:20 w/v, HS 0.5 ml  Sorrel, Sheep 1:20 w/v, HS 0.5 ml  Diluent: HSA qs to 5ml      ORDER FOR ALLERGEN IMMUNOTHERAPY Name of Mix: Mix #3  Grass, Tree   Portillo, White 1:20 w/v, HS  0.5 ml  Birch Mix PRW 1:20 w/v, HS  0.5 ml  Boxelder-Maple Mix BHR (Boxelder Hard Red) 1:20 w/v, HS  0.5 ml  Meade, Common 1:20 w/v, HS  0.5 ml  Elm, American 1:20 w/v, HS  0.5 ml  Oak Mix RVW 1:20 w/v, HS 0.5 ml  Pine Mix 1:20 w/v, HS 0.5 ml  [place] Tree, Black 1:20 w/v, HS 0.5 ml  Jose Grass 1:20 w/v, HS 0.5 ml  Harvey Grass (Std) 100,000 BAU/mL, HS 0.4 ml  Diluent: HSA qs to 5ml      varenicline (CHANTIX) 1 MG tablet Take 1 tablet (1 mg) by mouth 2 times daily. After finishing starter pack 180 tablet 2    vitamin D3 (CHOLECALCIFEROL) 50 mcg (2000 units) tablet Take 1 tablet (50 mcg) by mouth daily. 90 tablet 3     No current facility-administered medications for this visit.       Past Medical History:   Diagnosis Date    Acute appendicitis with peritoneal abscess 02/11/09    D/C 02/13/09    Fracture of ankle, trimalleolar, closed        Past Surgical History:   Procedure Laterality Date    APPENDECTOMY  3/10/2009    COLONOSCOPY  11/06/09    OPEN REDUCTION INTERNAL FIXATION ANKLE  8/16/2013    Procedure: OPEN REDUCTION INTERNAL FIXATION ANKLE;  Open Reduction Internal Fixation Right Ankle;  Surgeon: George De La Rosa DPM;  Location: [facility]       Family History   Problem Relation Age of Onset    No Known  Problems Mother     Diabetes Type 2  Father     No Known Problems Sister     No Known Problems Brother     Coronary Artery Disease Paternal Grandfather         MI age 65    Colon Cancer No family hx of     Prostate Cancer No family hx of        Social History     Tobacco Use    Smoking status: Former     Types: Cigarettes     Passive exposure: Past    Smokeless tobacco: Never   Substance Use Topics    Alcohol use: Yes     Comment: occ           Objective      /88 (BP Location: Right arm, Patient Position: Sitting, Cuff Size: Adult Large)   Pulse 80   Temp 97.9  F (36.6  C) (Temporal)   Resp 16   Wt 128.4 kg (283 lb)   SpO2 99%   BMI 31.88 kg/m        Physical Exam  Constitutional:       General: He is not in acute distress.     Appearance: He is not ill-appearing.   Cardiovascular:      Rate and Rhythm: Normal rate.   Pulmonary:      Effort: Pulmonary effort is normal. No respiratory distress.   Musculoskeletal:      Cervical back: Neck supple.   Neurological:      General: No focal deficit present.      Mental Status: He is alert.   Psychiatric:         Behavior: Behavior normal.                Signed Electronically by: PATRICIA Young CNP

## 2025-06-06 ENCOUNTER — RESULTS FOLLOW-UP (OUTPATIENT)
Dept: FAMILY MEDICINE | Facility: CLINIC | Age: 35
End: 2025-06-06

## 2025-06-20 ENCOUNTER — RESULTS FOLLOW-UP (OUTPATIENT)
Dept: FAMILY MEDICINE | Facility: CLINIC | Age: 35
End: 2025-06-20

## 2025-06-20 NOTE — RESULT ENCOUNTER NOTE
"Dear Phill   Your triglycerides were above normal.  Poor diet, genetics and being overweight can contribute to this.  1000 mg daily of omega-3 fatty acids may improve this.  Maintaining a healthy diet with lean proteins, whole grains and healthy fats such as olive oil as well as regular exercise and maintaining an appropriate weight all contribute to healthier cholesterol levels.    Your LDL (bad cholesterol) looks ok.  Your blood sugar is borderline elevated.    This is in the \"prediabetes\" range.  You are not currently diabetic, but at risk for developing this disease in the future.   Exercise, weight loss,  and limiting carbohydrates and sugars in the diet can be helpful to avoid progression to diabetes.  At minimum we need to check your blood sugar annually.    Please be seen urgently if you develop any signs or symptoms of diabetes (increase thirst or urination, fatigue, blurry vision, unexplained weight loss).     Your electrolytes, kidney function and liver function were normal.   Your thyroid function was normal.    Your A1C (test that looks at the average blood sugar over the last 3 months) was normal.   Your vitamin D level is still in process.      Please call or MyChart my office with any questions or concerns.   Jacquie Flores, PAC                "

## 2025-06-24 ASSESSMENT — PATIENT HEALTH QUESTIONNAIRE - PHQ9
SUM OF ALL RESPONSES TO PHQ QUESTIONS 1-9: 14
10. IF YOU CHECKED OFF ANY PROBLEMS, HOW DIFFICULT HAVE THESE PROBLEMS MADE IT FOR YOU TO DO YOUR WORK, TAKE CARE OF THINGS AT HOME, OR GET ALONG WITH OTHER PEOPLE: EXTREMELY DIFFICULT
SUM OF ALL RESPONSES TO PHQ QUESTIONS 1-9: 14

## 2025-06-25 ENCOUNTER — VIRTUAL VISIT (OUTPATIENT)
Facility: CLINIC | Age: 35
End: 2025-06-25
Payer: COMMERCIAL

## 2025-06-25 DIAGNOSIS — F43.9 TRAUMA AND STRESSOR-RELATED DISORDER: ICD-10-CM

## 2025-06-25 DIAGNOSIS — F41.1 GENERALIZED ANXIETY DISORDER: ICD-10-CM

## 2025-06-25 DIAGNOSIS — F90.0 ATTENTION DEFICIT HYPERACTIVITY DISORDER, INATTENTIVE TYPE, MODERATE: Primary | ICD-10-CM

## 2025-06-25 PROCEDURE — 96131 PSYCL TST EVAL PHYS/QHP EA: CPT | Mod: 95 | Performed by: PSYCHOLOGIST

## 2025-06-25 PROCEDURE — 96130 PSYCL TST EVAL PHYS/QHP 1ST: CPT | Mod: 95 | Performed by: PSYCHOLOGIST

## 2025-06-25 NOTE — PATIENT INSTRUCTIONS
Coping with Attention-Deficit/Hyperactivity Disorder (ADHD)    If you have ADHD, it can be hard to sit still, pay attention or control impulsive behavior. ADHD can cause problems at home, at school, at work and in social settings. But you can learn ways to control your symptoms. Below are some ideas for coping with the most common ADHD problems.     Memory, Focus, and Managing Time  Be mindful of time. Use a watch, phone, timer, alarm, PDA or computer--anything that keeps accurate time that you can see and hear at all times. Set more than one alarm to remind you how much time you have left for a task. Give yourself more time than you think you need. For important appointments or deadlines, set reminder alarms hours or days ahead of time.   Use a day planner. A day planner can help you manage time and remember responsibilities. Learning to use a planner is just like learning to use any tool--practice makes perfect.   Use lists. Lists and notes can help you keep track of regular tasks, projects, deadlines and appointments. If you decide to use a daily planner, keep all lists and notes inside of it. Use color codes for tasks so you know which ones are the most important.  Learn to say no and set boundaries. Do not take on too many projects or social engagements. Overbooking yourself can be overwhelming and can lead to missed commitments.  Repeat out loud the instructions you have been given. This will help you remember, as well as let others correct you if needed.    Organization  Create space. Ask yourself what you need on a daily basis. Then, find storage bins or closets for things you don t need every day. Have specific areas for things like keys, bills and other items that are easy to misplace. Throw away or donate things you don t need.   Deal with it now. You can avoid forgetfulness, clutter and procrastination by doing things right away, not some time in the future. This includes filing papers, cleaning up  messes, opening and responding to mail and returning phone calls.  Set up a filing system. Use dividers or separate file folders for different types of documents, such as medical records, receipts and pay stubs. Label and color-code your files so that you can quickly find what you need.   Break larger tasks into small, manageable pieces. Write down the steps needed to complete the task. Follow each step in order until the task is done. If needed, take small, timed breaks and return to finish the task.  Organize your work space. Use lists or planners to organize your day. Remove clutter from your desk. Label any storage bins. Reduce distraction as much as possible. Ideas include sitting facing the wall, closing your door or using a white noise machine.    Sitting Still  Move around as needed. Don t fight the urge to move around. If you need to fidget or stand up for a period of time to help maintain attention, then do so. But take care that you re not interrupting others. You may also find it helpful to squeeze a stress ball.  Be active in a useful way. Exercise can burn off extra energy, relieve stress, boost your mood and calm your mind. Meditation, yoga or maria chi can help you better control your attention and impulses.  Stay healthy. Get enough sleep. Avoid caffeine late in the day. Exercise. Create a relaxing bedtime routine. Stick to a schedule or daily routine. Eat small meals throughout the day. Avoid sugar, eat fewer carbohydrates and eat more protein.     Close Relationships  Talk to your loved ones about ADHD. There are many resources available that can help your loved one understand ADHD. See the Resources section below.  Divide daily tasks based on each person s strengths. For example, if you have trouble with organization, you may not want to do financial planning tasks.  If you have trouble with focus, develop a sign that others in your life can use as a gentle reminder to pay attention. The sign  should be small but meaningful to you and the other person.  Improve communication. Use a dry erase board in a common area to help the whole family stay in touch better. Keep regular to-do lists and compare scheduled activities every day. Writing notes to each other is also very helpful.  Be an active listener and try not to interrupt. If you find your mind wandering when others are talking, mentally repeat their words so you can follow the conversation. Ask questions and ask people to repeat what they said if needed. Pay close attention to body language.   Organize your thoughts. If you need to have a serious conversation, write a list of the points you would like to make or the important ideas you want to talk about. This can help you stay focused and remember what you need to say.  Think before speaking. It can be hard to control your impulses when you feel strongly about something. Before saying whatever pops into your head, stop to ask yourself  Will this be helpful?  or  Will this help me get what I want?   Take charge of your life. Work to accept that some things are harder for you. Make a plan to address these troubles and seek the support you need.    Online Resources  ADD Warehouse. http://www.addMyworldwallhouse.com  Attention Deficit Disorder Association. http://www.add.org  Children and Adults with Attention-Deficit/Hyperactivity Disorder (NASIR). http://www.nasir.org/  Disha Dhaliwal.  33 Ways to Get Organized with Adult ADHD.  http://www.additudemag.com/adhd/article/729.html  National Resource Center on ADHD. http://www.mkzs6huxu.org/  Marlyn Sanabria.  Daily Living Tips for Adult ADHD.  http://www.medicinenet.com/living_tips_adult_adhd_pictures_slideshow/article.htm  Robin Suarez and Sharon Miles.  Help for Adult ADD / ADHD.  http://www.helpguide.org  You can also find many apps for your phone that can help you with common ADHD struggles    Book Resources  Igor Singh. ADHD in Adults.  (2008).  Igor Singh. Taking Charge of Adult ADHD. (2010).  Phil Martini and Conor Ruth. Delivered from Distraction. (2006).  Phil Martini and Conor Ruth. Driven to Distraction. (2011).  Lizett Holm. ADD in the Workplace. (1997).  Lizett Holm. ADD-Friendly Ways to Organize Your Life. (2002).  Caroline Mario. The ADHD Effect on Marriage: Understand and Rebuild Your Relationship in Six Steps. (2010).  Rosie Sierra and Anabelle Kline. You Mean I m Not Lazy, Stupid or Crazy? (2006).  Jose D Jhaveri. Understand Your Brain, Get More Done: The ADHD Executive Functions Workbook. (2012).  Eren Wheeler. ADHD Is Awesome. (2024).    Support Groups  ADHD Adult Support Group  [address].  7:00 to 8:30 p.m., last Thursday of each month (except December).  Contact/RSVP: [email or web address]    ADHD Adult Support Group  [facility], [address] [place] [address].  Thursday 10:00 a.m. to 12:00 p.m. or 7:00 to 9:30 p.m.  Contact/RSVP: Vasu Santoro. [phone] or [email or web address] [email or web address]     ADHD Adult Support Group for Spouses/Partners of adults with ADHD  [facility], [address] [place] [address].  Tuesday 12:00 to 2:00 p.m.   Contact/RSVP: Vasu Santoro. [phone] or [email or web address]

## 2025-06-25 NOTE — PROGRESS NOTES
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  Inattention symptoms have also persisted beyond trauma symptoms.  As a result, problems are conceptualized as having a neurodevelopmental basis.  See recommendations below.    Referral Question Response: DSM-5 criteria for ADHD:   A. Symptom Count - Are there sufficient symptoms for the diagnosis? Yes, patient did endorse sufficient inattention symptoms.   B. Onset - Were several symptoms present before 11 years of age? Yes, a significant number of symptoms reportedly began in childhood.  C. Pervasiveness - Are several symptoms present in at least two settings? Yes, patient reported that symptoms are problematic at home and work.   D. Impairment - Do symptoms interfere with or reduce the quality of functioning? Yes, patient is unable to complete daily tasks effectively.   E. Exclusions - Are symptoms better explained by another disorder or factor?  No, symptoms are not better explained by anxiety and trauma symptoms. Difficulties are explained by an organic basis of inattention.     The patient meets the following DSM-5 criteria for generalized anxiety disorder:  A. Excessive anxiety and worry, occurring more days than not for at least 6 months about a number of events or activities.   B. The individual finds it difficult to control  the worry.  C. The anxiety and worry are associated with 3 or more of 6 symptoms.  D. The anxiety, worry, or physical symptoms cause clinically significant distress or impairment in social, occupational, or other important areas of functioning.  E. The disturbance is not attributable to the physiological effects of a substance (e.g., a drug of abuse, a medication) or another medical condition (e.g., hyperthyroidism).  F. The disturbance is not better explained by another mental disorder.    The patient also meets the following DSM-5 criteria for unspecified trauma disorder:  A. Exposure to actual or threatened death, serious injury, or sexual violence in 1 or more of the following ways:  1. Directly experiencing the traumatic event.  B. Presence of 1 or more of the following intrusion symptoms associated with the traumatic event, beginning after the traumatic event occurred:  1. Recurrent, involuntary, and intrusive distressing memories of the traumatic event.  4. Intense or prolonged psychological distress at exposure to internal or external cues that symbolize or resemble an aspect of the traumatic event.  F. Duration of the disturbance is more than 1 month.  G. The disturbance causes clinically significant distress or impairment in social, occupational, or other important areas of functioning.  H. The disturbance is not attributable to the physiological effects of a substance or another medical condition.    DIAGNOSES:  F90.0 Attention-Deficit/Hyperactivity Disorder, inattentive presentation, moderate  F41.1 Generalized anxiety disorder  F43.9 Unspecified trauma disorder    PLAN OF CARE:  Discuss the following with your primary care provider:  Consider a trial of a stimulant medication. This may help alleviate some of the patient's attentional symptoms.   Consider a trial of a psychotropic medication. This may help alleviate some of the patient's anxiety symptoms.     Continue individual  psychotherapy.    RECOMMENDATIONS:  Due to the patient's reported attention, concentration, and mood difficulties, the following health/lifestyle changes when combined, can significantly improve symptoms:   Avoid simple carbohydrates at breakfast. Aim for only complex carbohydrates and lean protein for your morning meal.   Engage in aerobic exercise 3 times per week for 30 minutes, ensuring that your heart rate stays within your training zone. Further, reading the book,  Spark,  by Conor Ruth M.D. can help the patient understand the benefits of exercise on the brain.   Research suggest that taking a high-quality multi-vitamin and antioxidant (1/2 cup of blueberries) daily in conjunction with balanced nutrition can be helpful.  Aim for the high end of daily water intake: around 72 ounces per day.  Ensure regular meals and snacks to maintain optimal attention.    The following may be beneficial in managing some of the patient's attention and concentration difficulties:  Due to the patient's difficulties with attention and concentration, consider working in a completely distraction-free area while completing tasks. Workspaces should be completely clear except for the materials needed for the current task. Both visual and auditory distractions should be decreased as much as possible.  Considering decreased ability to focus and maintain attention, it is recommended that the patient take frequent breaks while completing tasks. This will help to maintain attention and effort. The patient may benefit from the use of a [employer] Timer. The timer works by using built-in break times. After working on a task consistently for 25 minutes, the timer reminds the user to take a five-minute break before continuing, etc. A [employer] timer can be downloaded as a free celi to a phone or tablet.  Due to the patient's attentional and concentration symptoms, it is recommended to increase organization with the use of lists and calendars.  Significantly increasing structure to the day and adhering to a set schedule can increase your ability to complete responsibilities, track deadline, etc. Breaking these tasks down into their component parts and recording them in a calendar/planner will likely be beneficial. Patient would benefit from setting feasible timelines for completion of activities. By establishing clear priorities for completing tasks, you can more likely complete the most important tasks first. The patient may also choose to elect to a friend or family member to help hold them accountable.    Avoid multitasking. Attempting to work on multiple tasks and projects the same increases the likelihood that an error will occur. Focus on one task at a time.    The patient may benefit from engaging in mindfulness practices. This may include breathing techniques, apps that provide guided meditation, or more interactive activities such as coloring.    See the attached tip sheet for more ideas as well as online and book resources.       Veronica Knox PsyD, LP  Clinical Psychologist

## 2025-06-25 NOTE — PROGRESS NOTES
"                                           [facility]   Mental Health & Addiction Services     Progress Note - ADHD Feedback Session     Patient Name: Oswaldo Garcia  Date: 2025       Service Type:  Individual       Session Start Time: 5:00pm  Session End Time:  5:29pm     Session Length: 29 minutes    Session #: 3    Attendees: Patient attended alone    Service Modality: Phone Visit:      Provider verified identity through the following two step process.  Patient provided:  Patient  and Patient address    Telephone Visit: The patient's condition can be safely assessed and treated via synchronous audio telemedicine encounter.      Reason for Audio Telemedicine Visit: Patient convenience (e.g. access to timely appointments / distance to available provider)    Originating Site (Patient Location): Patient's home    Distant Site (Provider Location): Provider Remote Setting- Home Office    Telephone visit completed due to [employer] technical difficulties.  Both patient and provider unable to connect to video session.    Consent:  The patient/guardian has verbally consented to:     1. The potential risks and benefits of telemedicine (telephone visit) versus in person care;    The patient has been notified of the following:      \"We have found that certain health care needs can be provided without the need for a face to face visit.  This service lets us provide the care you need with a phone conversation.       I will have full access to your [facility] medical record during this entire phone call.  I will be taking notes for your medical record.      Since this is like an office visit, we will bill your insurance company for this service.       There are potential benefits and risks of telephone visits (e.g. limits to patient confidentiality) that differ from in-person visits.?Confidentiality still applies for telephone services, and nobody will record the visit.  It is important to be " "in a quiet, private space that is free of distractions (including cell phone or other devices) during the visit.??      If during the course of the call I believe a telephone visit is not appropriate, you will not be charged for this service\"     Consent has been obtained for this service by care team member: Yes           5/6/2025    10:16 AM 6/24/2025     5:55 PM   PHQ   PHQ-9 Total Score 13  14    Q9: Thoughts of better off dead/self-harm past 2 weeks Not at all Not at all       Patient-reported           11/20/2024     4:49 PM 5/6/2025    10:26 AM   SANAZ-7 SCORE   Total Score  12 (moderate anxiety)   Total Score 7 12        Patient-reported         DATA      Progress Since Last Session (Related to Symptoms / Goals / Homework):   Symptoms: Stable.    Homework: Completed.      Treatment Objective(s) Addressed in This Session:   Provided feedback on ADHD evaluation. Reviewed test results in depth. Plan of care and recommendations were discussed based on testing data. See full report attached on secondary note in this encounter.     Intervention:   Provided feedback to patient regarding testing results, diagnoses, and treatment recommendations. Test results are consistent with an ADHD diagnosis. Symptoms are not better explained by anxiety and trauma. Personalized suggestions regarding symptoms were offered. Patient had the opportunity to ask questions; he expressed understanding.        ASSESSMENT: Current Emotional / Mental Status (status of significant symptoms):   Risk status (Self / Other harm or suicidal ideation)   Patient denies current fears or concerns for personal safety.   Patient denies current or recent suicidal ideation or behaviors.   Patient denies current or recent homicidal ideation or behaviors.   Patient denies current or recent self injurious behavior or ideation.   Patient denies other safety concerns.   Patient reports there has been no change in risk factors since their last session.  "    Patient reports there has been no change in protective factors since their last session.     Recommended that patient call 911 or go to the local ED should there be a change in any of these risk factors     Appearance:   Appropriate    Eye Contact:   Unable to assess due to phone appointment    Psychomotor Behavior: Unable to assess due to phone appointment    Attitude:   Cooperative    Orientation:   All   Speech    Rate / Production: Normal     Volume:  Normal    Mood:    Euthymic   Affect:    Unable to assess due to phone appointment    Thought Content:  Clear    Thought Form:  Coherent  Logical    Insight:    Good      Medication Review:   No current psychiatric medications prescribed     Medication Compliance:   NA     Changes in Health Issues:   None reported     Chemical Use Review:   Substance Use: Chemical use reviewed, no active concerns identified      Nicotine Use: No current tobacco use.      Diagnosis:  1. Attention deficit hyperactivity disorder, inattentive type, moderate    2. Generalized anxiety disorder    3. Trauma and stressor-related disorder        PLAN:   Recommendations are outlined in full evaluation report (attached to this encounter).   Patient indicated understanding and will contact the clinic if there are further questions.    Parts of this documentation may have been completed using dictation software. Potential errors may result and are unintentional.       Veronica Knox PsyD,   Clinical Psychologist         Psychological Testing Services Summary       Testing Evaluation Services Base: 81747  (first 60 mins) Add-on: 55258  (each addtl 60 mins)   Record Review and Clarify Referral Question   10:50am-11:00am on 5/6/2025 10 minutes   Clinical Decision Making/Battery Modification   2:45pm-3:00pm on 5/13/2025 15 minutes   Integration/Report Generation   8:00am-9:00am on 6/24/2025 ([facility])  9:00am-9:30am on 6/24/2025 ([facility])  12:00pm-1:00pm on 6/24/2025 (Final Report)    60 minutes  30 minutes  60 minutes   Interactive Feedback Session   5:00pm-5:29pm on 6/25/2025 29 minutes   Post-Service Work   5:29pm-5:44pm on 6/25/2025 15 minutes   Total Time: 219 minutes   Total Units: 1 3       Diagnoses:   1. Attention deficit hyperactivity disorder, inattentive type, moderate    2. Generalized anxiety disorder    3. Trauma and stressor-related disorder

## 2025-06-26 ENCOUNTER — TELEPHONE (OUTPATIENT)
Dept: FAMILY MEDICINE | Facility: CLINIC | Age: 35
End: 2025-06-26

## 2025-06-26 ENCOUNTER — E-VISIT (OUTPATIENT)
Dept: FAMILY MEDICINE | Facility: CLINIC | Age: 35
End: 2025-06-26
Payer: COMMERCIAL

## 2025-06-26 DIAGNOSIS — R69 DIAGNOSIS UNKNOWN: Primary | ICD-10-CM

## 2025-06-26 ASSESSMENT — ANXIETY QUESTIONNAIRES
2. NOT BEING ABLE TO STOP OR CONTROL WORRYING: SEVERAL DAYS
GAD7 TOTAL SCORE: 9
7. FEELING AFRAID AS IF SOMETHING AWFUL MIGHT HAPPEN: NOT AT ALL
8. IF YOU CHECKED OFF ANY PROBLEMS, HOW DIFFICULT HAVE THESE MADE IT FOR YOU TO DO YOUR WORK, TAKE CARE OF THINGS AT HOME, OR GET ALONG WITH OTHER PEOPLE?: VERY DIFFICULT
GAD7 TOTAL SCORE: 9
3. WORRYING TOO MUCH ABOUT DIFFERENT THINGS: MORE THAN HALF THE DAYS
IF YOU CHECKED OFF ANY PROBLEMS ON THIS QUESTIONNAIRE, HOW DIFFICULT HAVE THESE PROBLEMS MADE IT FOR YOU TO DO YOUR WORK, TAKE CARE OF THINGS AT HOME, OR GET ALONG WITH OTHER PEOPLE: VERY DIFFICULT
1. FEELING NERVOUS, ANXIOUS, OR ON EDGE: MORE THAN HALF THE DAYS
GAD7 TOTAL SCORE: 9
7. FEELING AFRAID AS IF SOMETHING AWFUL MIGHT HAPPEN: NOT AT ALL
5. BEING SO RESTLESS THAT IT IS HARD TO SIT STILL: SEVERAL DAYS
6. BECOMING EASILY ANNOYED OR IRRITABLE: SEVERAL DAYS
4. TROUBLE RELAXING: MORE THAN HALF THE DAYS

## 2025-06-26 ASSESSMENT — PATIENT HEALTH QUESTIONNAIRE - PHQ9
10. IF YOU CHECKED OFF ANY PROBLEMS, HOW DIFFICULT HAVE THESE PROBLEMS MADE IT FOR YOU TO DO YOUR WORK, TAKE CARE OF THINGS AT HOME, OR GET ALONG WITH OTHER PEOPLE: EXTREMELY DIFFICULT
SUM OF ALL RESPONSES TO PHQ QUESTIONS 1-9: 10
SUM OF ALL RESPONSES TO PHQ QUESTIONS 1-9: 10

## 2025-06-26 NOTE — PATIENT INSTRUCTIONS
Dear Phill,    We are sorry you are not feeling well. Based on the responses you provided, it is recommended that you be seen in-person in clinic so we can better evaluate your symptoms. Please schedule this visit in [employer]. You will have a Schedule Now button in [employer] to help with scheduling this appointment. Otherwise, you can call [phone] to schedule an appointment.     You will not be charged for this eVisit. Thank you for trusting us with your care.     Jacquie Flores PA-C

## 2025-06-26 NOTE — TELEPHONE ENCOUNTER
Please call patient and advise that evisit is not appropriate- needs a video visit or face to face visit .  Same day  or NP OK or ok to discuss on 7/18/25 scheduled visit   I will not prescribe stimulants through an evisit

## 2025-06-27 ENCOUNTER — MYC MEDICAL ADVICE (OUTPATIENT)
Dept: FAMILY MEDICINE | Facility: CLINIC | Age: 35
End: 2025-06-27
Payer: COMMERCIAL

## 2025-06-29 NOTE — RESULT ENCOUNTER NOTE
"Dear Phill  Your vitamin D level was normal.  Continue supplements as you have been taking.    Your triglycerides are high.  Your HDL is low (this is the good cholesterol - we like to see this above 50- regular aerobic exercise can increase the good cholesterol.  The cholesterol panel shows the LDL (bad cholesterol) was higher than ideal.  I would encourage you to work on lifestyle measures to bring your cholesterol down and reduce cardiovascular risks. Plan to recheck your cholesterol yearly.     Recommendations to reduce cholesterol and cardiovascular risks:  Diet:  -Try to eat more vegetables, fruits, legumes, nuts/seeds, whole grains, and fish.  - try to eat less red meat, processed meats, processed foods, sweetened beverages.  - try to replace saturated fat in your diet with mono- and poly-unsaturated fats  Exercise:  Aim for regular exercise with a goal of 150 min of moderate to high intensity aerobic exercise per week      Your blood sugar is borderline elevated.    This is in the \"prediabetes\" range.  You are not currently diabetic, but at risk for developing this disease in the future.   Exercise, weight loss,  and limiting carbohydrates and sugars in the diet can be helpful to avoid progression to diabetes.  At minimum we need to check your blood sugar annually.    Please be seen urgently if you develop any signs or symptoms of diabetes (increase thirst or urination, fatigue, blurry vision, unexplained weight loss).     Your thyroid function was normal.    Your A1C (test that looks at the average blood sugar over the last 3 months) was normal.     Please call or MyChart my office with any questions or concerns.   Jacquie Flores, PAC         "

## 2025-06-30 ENCOUNTER — ALLIED HEALTH/NURSE VISIT (OUTPATIENT)
Dept: ALLERGY | Facility: CLINIC | Age: 35
End: 2025-06-30
Payer: COMMERCIAL

## 2025-06-30 ENCOUNTER — PATIENT OUTREACH (OUTPATIENT)
Dept: CARE COORDINATION | Facility: CLINIC | Age: 35
End: 2025-06-30

## 2025-06-30 DIAGNOSIS — J30.81 ALLERGIC RHINITIS DUE TO ANIMAL HAIR AND DANDER: ICD-10-CM

## 2025-06-30 DIAGNOSIS — J30.89 ALLERGIC RHINITIS DUE TO MOLD: ICD-10-CM

## 2025-06-30 DIAGNOSIS — J30.1 SEASONAL ALLERGIC RHINITIS DUE TO POLLEN: Primary | ICD-10-CM

## 2025-06-30 PROCEDURE — 95117 IMMUNOTHERAPY INJECTIONS: CPT

## 2025-07-03 ENCOUNTER — MYC MEDICAL ADVICE (OUTPATIENT)
Dept: FAMILY MEDICINE | Facility: CLINIC | Age: 35
End: 2025-07-03
Payer: COMMERCIAL

## 2025-07-03 NOTE — TELEPHONE ENCOUNTER
Patient has a follow up appointment in person with PCP on 7/18/25 to discuss medication.  Has been in communication over phone with TC's  PSK

## 2025-07-03 NOTE — TELEPHONE ENCOUNTER
7/3 - LVM - Patient requested a call back. No virtual appt needed on 7/10 - this was cancelled.  The in-person visit is scheduled for Friday 7/18.

## 2025-07-03 NOTE — TELEPHONE ENCOUNTER
Patient called back and was informed of message below. Patient is aware and will be attending an in person appointment.    Milad Saavedra

## 2025-07-18 ENCOUNTER — OFFICE VISIT (OUTPATIENT)
Dept: FAMILY MEDICINE | Facility: CLINIC | Age: 35
End: 2025-07-18
Attending: PHYSICIAN ASSISTANT
Payer: COMMERCIAL

## 2025-07-18 VITALS
OXYGEN SATURATION: 96 % | SYSTOLIC BLOOD PRESSURE: 120 MMHG | HEART RATE: 78 BPM | TEMPERATURE: 97.8 F | DIASTOLIC BLOOD PRESSURE: 80 MMHG | WEIGHT: 275.3 LBS | BODY MASS INDEX: 31.01 KG/M2

## 2025-07-18 DIAGNOSIS — F17.290 VAPING NICOTINE DEPENDENCE, TOBACCO PRODUCT: ICD-10-CM

## 2025-07-18 DIAGNOSIS — F90.0 ADHD (ATTENTION DEFICIT HYPERACTIVITY DISORDER), INATTENTIVE TYPE: Primary | ICD-10-CM

## 2025-07-18 PROCEDURE — 3079F DIAST BP 80-89 MM HG: CPT | Performed by: PHYSICIAN ASSISTANT

## 2025-07-18 PROCEDURE — G2211 COMPLEX E/M VISIT ADD ON: HCPCS | Performed by: PHYSICIAN ASSISTANT

## 2025-07-18 PROCEDURE — 99213 OFFICE O/P EST LOW 20 MIN: CPT | Performed by: PHYSICIAN ASSISTANT

## 2025-07-18 PROCEDURE — 3074F SYST BP LT 130 MM HG: CPT | Performed by: PHYSICIAN ASSISTANT

## 2025-07-18 RX ORDER — VARENICLINE TARTRATE 1 MG/1
1 TABLET, FILM COATED ORAL 2 TIMES DAILY
Qty: 180 TABLET | Refills: 0 | Status: SHIPPED | OUTPATIENT
Start: 2025-07-18

## 2025-07-18 RX ORDER — DEXTROAMPHETAMINE SACCHARATE, AMPHETAMINE ASPARTATE MONOHYDRATE, DEXTROAMPHETAMINE SULFATE AND AMPHETAMINE SULFATE 2.5; 2.5; 2.5; 2.5 MG/1; MG/1; MG/1; MG/1
10 CAPSULE, EXTENDED RELEASE ORAL DAILY
Qty: 30 CAPSULE | Refills: 0 | Status: SHIPPED | OUTPATIENT
Start: 2025-07-18

## 2025-07-18 NOTE — PATIENT INSTRUCTIONS
Start adderall XR10 mg daily  Follow up with me in one month.  If stimulants are not for me - message me and will start strattera   Follow up with me in one month   Return urgently if any change in symptoms.

## 2025-07-18 NOTE — PROGRESS NOTES
"Vicodin six on 10/24/24    Assessment & Plan     ADHD (attention deficit hyperactivity disorder), inattentive type  Start adderall XR 10mg daily and follow up with us in one month.  Side effects discussed   Notify me right away if palpitations, anxiety, unexplained weight loss, insomnia or other side effects.    - amphetamine-dextroamphetamine (ADDERALL XR) 10 MG 24 hr capsule  Dispense: 30 capsule; Refill: 0    Vaping nicotine dependence, tobacco product  Continue chantix   - varenicline (CHANTIX) 1 MG tablet  Dispense: 180 tablet; Refill: 0      BMI  Estimated body mass index is 31.01 kg/m  as calculated from the following:    Height as of 3/14/25: 2.007 m (6' 7\").    Weight as of this encounter: 124.9 kg (275 lb 4.8 oz).   Weight management plan: Discussed healthy diet and exercise guidelines    Follow-up    Follow-up Visit   Expected date:  Aug 18, 2025 (Approximate)      Follow Up Appointment Details:     Follow-up with whom?: Me    Follow-Up for what?: Chronic Disease f/u    Chronic Disease f/u: General (Other)    Additional Details: adhd    How?: In Person    Is this an as-needed follow-up?: No               Patient Instructions   Start adderall XR10 mg daily  Follow up with me in one month.  If stimulants are not for me - message me and will start strattera   Follow up with me in one month   Return urgently if any change in symptoms.       The longitudinal plan of care for the diagnosis(es)/condition(s) as documented were addressed during this visit. Due to the added complexity in care, I will continue to support Phill in the subsequent management and with ongoing continuity of care.    Souleymane Pollock is a 34 year old, presenting for the following health issues:  No chief complaint on file.        7/18/2025     6:54 AM   Additional Questions   Roomed by Tshia   Accompanied by self     History of Present Illness       Reason for visit:  ADHD medication  Symptom onset:  More than a month  Symptoms include:  " Inability to concentrate or retain information, difficulty communicating at work, restless body, dopamine chasing.  Symptom intensity:  Moderate  Symptom progression:  Worsening  Had these symptoms before:  Yes  Has tried/received treatment for these symptoms:  Yes  Previous treatment was successful:  No  What makes it worse:  Needing to focus on one specific goal  What makes it better:  Having multiple things going on, flexible schedule.   He is taking medications regularly.      Patient known to me with newly diagnosed ADHD through [facility] presents to start medication for ADHD.  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   Symptoms going on since I can remember   Seen as child as ADHD and observed me for a day or two and  just kept going and as of year ago seeing therapist sympromts worse and affecting how I was performing at my job.  As far as he knows no family members are treated for ADHD.  Got a promotion and has more of a leadership role.  Not vaping with aide of chantix- would like to continue.               Review of Systems  Constitutional, HEENT, cardiovascular, pulmonary, gi and gu systems are negative, except as otherwise noted.      Objective    /80 (BP Location: Right arm, Patient Position: Sitting, Cuff Size: Adult Large)   Pulse 78   Temp 97.8  F (36.6  C) (Oral)   Wt 124.9 kg (275 lb 4.8 oz)   SpO2 96%   BMI 31.01 kg/m    Body mass index is 31.01 kg/m .  Physical Exam   GENERAL: alert and no distress  NECK: no adenopathy, no asymmetry, masses, or scars  RESP: lungs clear to auscultation - no rales, rhonchi or wheezes  CV: regular rate and rhythm, normal S1 S2, no S3 or S4, no murmur, click or rub, no peripheral edema  PSYCH: mentation appears normal, affect normal/bright, judgement and insight intact, and appearance well groomed            Signed Electronically by: Jacquie Flores PA-C

## 2025-07-21 ENCOUNTER — PATIENT OUTREACH (OUTPATIENT)
Dept: CARE COORDINATION | Facility: CLINIC | Age: 35
End: 2025-07-21
Payer: COMMERCIAL

## 2025-07-23 ENCOUNTER — DOCUMENTATION ONLY (OUTPATIENT)
Dept: FAMILY MEDICINE | Facility: CLINIC | Age: 35
End: 2025-07-23

## 2025-07-23 ENCOUNTER — LAB (OUTPATIENT)
Dept: LAB | Facility: CLINIC | Age: 35
End: 2025-07-23
Payer: COMMERCIAL

## 2025-07-23 DIAGNOSIS — Z22.39 CARRIER OF UREAPLASMA UREALYTICUM: ICD-10-CM

## 2025-07-23 DIAGNOSIS — Z22.39 CARRIER OF UREAPLASMA UREALYTICUM: Primary | ICD-10-CM

## 2025-07-23 PROCEDURE — 87563 M. GENITALIUM AMP PROBE: CPT | Mod: 90

## 2025-07-23 PROCEDURE — 87798 DETECT AGENT NOS DNA AMP: CPT | Mod: 90

## 2025-07-23 PROCEDURE — 99000 SPECIMEN HANDLING OFFICE-LAB: CPT

## 2025-07-23 NOTE — PROGRESS NOTES
Oswaldo Garcia has an upcoming lab appointment:    Future Appointments   Date Time Provider Department Center   7/23/2025  3:30 PM FZ LAB [facility]   7/24/2025  4:30 PM Jacquie Flores PA-C [facility]   7/29/2025  4:10 PM [facility] ALLERGY SH[facility]S [facility]   8/22/2025  7:30 AM Jacquie Flores PA-C [facility]   8/28/2025  7:00 AM [facility] ALLERGY SHOTS [facility]   9/9/2025  8:00 AM Oswaldo Oliveros DPM [facility]   9/10/2025  7:30 AM Jacquie Flores PA-C [facility]     Patient is scheduled for the following lab(s): Pt needs a retest from      There is no order available. Please review and place either future orders or HMPO (Review of Health Maintenance Protocol Orders), as appropriate.    There are no preventive care reminders to display for this patient.  Naseem Marquez

## 2025-07-26 ENCOUNTER — TELEPHONE (OUTPATIENT)
Dept: FAMILY MEDICINE | Facility: CLINIC | Age: 35
End: 2025-07-26
Payer: COMMERCIAL

## 2025-07-26 NOTE — TELEPHONE ENCOUNTER
"Patient has not read [employer] results.  Please send letter with lab results.      Dear Phill  Your vitamin D level was normal.  Continue supplements as you have been taking.    Your triglycerides are high.  Your HDL is low (this is the good cholesterol - we like to see this above 50- regular aerobic exercise can increase the good cholesterol.  The cholesterol panel shows the LDL (bad cholesterol) was higher than ideal.  I would encourage you to work on lifestyle measures to bring your cholesterol down and reduce cardiovascular risks. Plan to recheck your cholesterol yearly.     Recommendations to reduce cholesterol and cardiovascular risks:  Diet:  -Try to eat more vegetables, fruits, legumes, nuts/seeds, whole grains, and fish.  - try to eat less red meat, processed meats, processed foods, sweetened beverages.  - try to replace saturated fat in your diet with mono- and poly-unsaturated fats  Exercise:  Aim for regular exercise with a goal of 150 min of moderate to high intensity aerobic exercise per week       Your blood sugar is borderline elevated.    This is in the \"prediabetes\" range.  You are not currently diabetic, but at risk for developing this disease in the future.   Exercise, weight loss,  and limiting carbohydrates and sugars in the diet can be helpful to avoid progression to diabetes.  At minimum we need to check your blood sugar annually.    Please be seen urgently if you develop any signs or symptoms of diabetes (increase thirst or urination, fatigue, blurry vision, unexplained weight loss).      Your thyroid function was normal.    Your A1C (test that looks at the average blood sugar over the last 3 months) was normal.      Please call or [facility] my office with any questions or concerns.   Jacquie Flores, PAC           "

## 2025-07-29 ENCOUNTER — ALLIED HEALTH/NURSE VISIT (OUTPATIENT)
Dept: ALLERGY | Facility: CLINIC | Age: 35
End: 2025-07-29
Payer: COMMERCIAL

## 2025-07-29 DIAGNOSIS — J30.81 ALLERGIC RHINITIS DUE TO ANIMAL HAIR AND DANDER: ICD-10-CM

## 2025-07-29 DIAGNOSIS — J30.89 ALLERGIC RHINITIS DUE TO MOLD: ICD-10-CM

## 2025-07-29 DIAGNOSIS — J30.1 SEASONAL ALLERGIC RHINITIS DUE TO POLLEN: Primary | ICD-10-CM

## 2025-07-29 PROCEDURE — 95117 IMMUNOTHERAPY INJECTIONS: CPT

## 2025-07-29 NOTE — PROGRESS NOTES
Oswaldo Garcia presents to clinic today at the request of Ewa Greenfield MD (ordering provider) for Allergy Immunotherapy injection(s).       This service provided today was under the care of Wilber Mccarthy MD ; the supervising provider of the day; who was available if needed.      Patient presented after waiting 30 minutes with no reaction to  injections. Discharged from clinic.    SHYLA MacN, RN, PHN

## 2025-08-22 ENCOUNTER — OFFICE VISIT (OUTPATIENT)
Dept: FAMILY MEDICINE | Facility: CLINIC | Age: 35
End: 2025-08-22
Payer: COMMERCIAL

## 2025-08-22 VITALS
HEIGHT: 78 IN | HEART RATE: 70 BPM | WEIGHT: 273.2 LBS | DIASTOLIC BLOOD PRESSURE: 84 MMHG | SYSTOLIC BLOOD PRESSURE: 133 MMHG | RESPIRATION RATE: 16 BRPM | BODY MASS INDEX: 31.61 KG/M2 | TEMPERATURE: 97.3 F | OXYGEN SATURATION: 98 %

## 2025-08-22 DIAGNOSIS — F90.0 ADHD (ATTENTION DEFICIT HYPERACTIVITY DISORDER), INATTENTIVE TYPE: ICD-10-CM

## 2025-08-22 PROCEDURE — 3079F DIAST BP 80-89 MM HG: CPT | Performed by: PHYSICIAN ASSISTANT

## 2025-08-22 PROCEDURE — 1126F AMNT PAIN NOTED NONE PRSNT: CPT | Performed by: PHYSICIAN ASSISTANT

## 2025-08-22 PROCEDURE — 3075F SYST BP GE 130 - 139MM HG: CPT | Performed by: PHYSICIAN ASSISTANT

## 2025-08-22 PROCEDURE — G2211 COMPLEX E/M VISIT ADD ON: HCPCS | Performed by: PHYSICIAN ASSISTANT

## 2025-08-22 PROCEDURE — 99214 OFFICE O/P EST MOD 30 MIN: CPT | Performed by: PHYSICIAN ASSISTANT

## 2025-08-22 RX ORDER — DEXTROAMPHETAMINE SACCHARATE, AMPHETAMINE ASPARTATE MONOHYDRATE, DEXTROAMPHETAMINE SULFATE AND AMPHETAMINE SULFATE 3.75; 3.75; 3.75; 3.75 MG/1; MG/1; MG/1; MG/1
15 CAPSULE, EXTENDED RELEASE ORAL 2 TIMES DAILY
Qty: 60 CAPSULE | Refills: 0 | Status: SHIPPED | OUTPATIENT
Start: 2025-08-22

## 2025-08-22 ASSESSMENT — PAIN SCALES - GENERAL: PAINLEVEL_OUTOF10: NO PAIN (0)

## 2025-08-25 ENCOUNTER — PATIENT OUTREACH (OUTPATIENT)
Dept: CARE COORDINATION | Facility: CLINIC | Age: 35
End: 2025-08-25
Payer: COMMERCIAL

## 2025-08-27 ENCOUNTER — MYC MEDICAL ADVICE (OUTPATIENT)
Dept: FAMILY MEDICINE | Facility: CLINIC | Age: 35
End: 2025-08-27
Payer: COMMERCIAL

## 2025-09-02 ENCOUNTER — TELEPHONE (OUTPATIENT)
Dept: ALLERGY | Facility: CLINIC | Age: 35
End: 2025-09-02